# Patient Record
Sex: MALE | Race: WHITE | Employment: OTHER | URBAN - METROPOLITAN AREA
[De-identification: names, ages, dates, MRNs, and addresses within clinical notes are randomized per-mention and may not be internally consistent; named-entity substitution may affect disease eponyms.]

---

## 2017-01-04 ENCOUNTER — ALLIED HEALTH/NURSE VISIT (OUTPATIENT)
Dept: FAMILY MEDICINE | Facility: OTHER | Age: 82
End: 2017-01-04
Attending: INTERNAL MEDICINE
Payer: MEDICARE

## 2017-01-04 ENCOUNTER — APPOINTMENT (OUTPATIENT)
Dept: LAB | Facility: OTHER | Age: 82
End: 2017-01-04
Attending: INTERNAL MEDICINE
Payer: MEDICARE

## 2017-01-04 VITALS — SYSTOLIC BLOOD PRESSURE: 139 MMHG | DIASTOLIC BLOOD PRESSURE: 68 MMHG

## 2017-01-04 DIAGNOSIS — I10 HTN (HYPERTENSION): Primary | ICD-10-CM

## 2017-01-04 PROCEDURE — 99207 ZZC NO CHARGE NURSE ONLY: CPT

## 2017-01-04 NOTE — PROGRESS NOTES
Patient here for a blood pressure, check states he has been taking amlodipine and not HCTZ. I told patient that this is not on his list but he insisted that he is still taking the amlodipine. I also read the note to the patient from 12/28/2016. Patient has follow up appointment on 01/23/2017. Will notify patient that he can either come in for labs for early. Either before appointment or a week before.   BP was 139/68. Dr. Schwab notified   [facility]

## 2017-01-16 ENCOUNTER — TELEPHONE (OUTPATIENT)
Dept: INTERNAL MEDICINE | Facility: OTHER | Age: 82
End: 2017-01-16

## 2017-01-16 ENCOUNTER — OFFICE VISIT (OUTPATIENT)
Dept: INTERNAL MEDICINE | Facility: OTHER | Age: 82
End: 2017-01-16
Attending: INTERNAL MEDICINE
Payer: MEDICARE

## 2017-01-16 VITALS
OXYGEN SATURATION: 98 % | HEART RATE: 58 BPM | RESPIRATION RATE: 18 BRPM | HEIGHT: 67 IN | DIASTOLIC BLOOD PRESSURE: 68 MMHG | WEIGHT: 158.2 LBS | BODY MASS INDEX: 24.83 KG/M2 | SYSTOLIC BLOOD PRESSURE: 120 MMHG

## 2017-01-16 DIAGNOSIS — I10 BENIGN ESSENTIAL HYPERTENSION: ICD-10-CM

## 2017-01-16 DIAGNOSIS — R09.89 PHLEGM IN THROAT: ICD-10-CM

## 2017-01-16 DIAGNOSIS — I10 BENIGN ESSENTIAL HYPERTENSION: Primary | ICD-10-CM

## 2017-01-16 LAB
ANION GAP SERPL CALCULATED.3IONS-SCNC: 11 MMOL/L (ref 3–14)
BUN SERPL-MCNC: 16 MG/DL (ref 7–30)
CALCIUM SERPL-MCNC: 8.7 MG/DL (ref 8.5–10.1)
CHLORIDE SERPL-SCNC: 104 MMOL/L (ref 94–109)
CO2 SERPL-SCNC: 24 MMOL/L (ref 20–32)
CREAT SERPL-MCNC: 1.2 MG/DL (ref 0.66–1.25)
GFR SERPL CREATININE-BSD FRML MDRD: 58 ML/MIN/1.7M2
GLUCOSE SERPL-MCNC: 96 MG/DL (ref 70–99)
POTASSIUM SERPL-SCNC: 4.2 MMOL/L (ref 3.4–5.3)
SODIUM SERPL-SCNC: 139 MMOL/L (ref 133–144)

## 2017-01-16 PROCEDURE — 36415 COLL VENOUS BLD VENIPUNCTURE: CPT | Performed by: INTERNAL MEDICINE

## 2017-01-16 PROCEDURE — 99213 OFFICE O/P EST LOW 20 MIN: CPT | Performed by: INTERNAL MEDICINE

## 2017-01-16 PROCEDURE — 99212 OFFICE O/P EST SF 10 MIN: CPT

## 2017-01-16 PROCEDURE — 80048 BASIC METABOLIC PNL TOTAL CA: CPT | Performed by: INTERNAL MEDICINE

## 2017-01-16 RX ORDER — AMLODIPINE BESYLATE 10 MG/1
10 TABLET ORAL
COMMUNITY
Start: 2016-12-28 | End: 2017-01-16

## 2017-01-16 RX ORDER — AMLODIPINE BESYLATE 10 MG/1
10 TABLET ORAL DAILY
Qty: 90 TABLET | Refills: 3 | Status: SHIPPED | OUTPATIENT
Start: 2017-01-16 | End: 2017-07-05

## 2017-01-16 ASSESSMENT — PAIN SCALES - GENERAL: PAINLEVEL: MODERATE PAIN (5)

## 2017-01-16 NOTE — MR AVS SNAPSHOT
"              After Visit Summary   1/16/2017    Javier Amador    MRN: [id number]           Patient Information     Date Of Birth          1935        Visit Information        Provider Department      1/16/2017 9:45 AM Yosvany Schwab,  [facility]        Today's Diagnoses     Benign essential hypertension    -  1        Follow-ups after your visit        Your next 10 appointments already scheduled     Jan 16, 2017  9:45 AM   (Arrive by 9:30 AM)   SHORT with Yosvany Schwab DO   [facility] ([facility] )    [address]  [place] [address] [phone]            Jan 23, 2017  9:30 AM   (Arrive by 9:15 AM)   SHORT with Yosvany Schwab DO   [facility] ([facility] )    [address]  [place] [address] [phone]              Who to contact     If you have questions or need follow up information about today's clinic visit or your schedule please contact [facility] directly at [phone].  Normal or non-critical lab and imaging results will be communicated to you by [employer], letter or phone within 4 business days after the clinic has received the results. If you do not hear from us within 7 days, please contact the clinic through [employer] or phone. If you have a critical or abnormal lab result, we will notify you by phone as soon as possible.  Submit refill requests through [employer] or call your pharmacy and they will forward the refill request to us. Please allow 3 business days for your refill to be completed.          Additional Information About Your Visit        MyChart Information     [employer] lets you send messages to your doctor, view your test results, renew your prescriptions, schedule appointments and more. To sign up, go to [email or web address] . Click on \"Log in\" on the left side of the screen, which will take you to the Welcome page. Then click on " "\"Sign up Now\" on the right side of the page.     You will be asked to enter the access code listed below, as well as some personal information. Please follow the directions to create your username and password.     Your access code is: [email or web address]  Expires: 2017  1:35 PM     Your access code will  in 90 days. If you need help or a new code, please call your [facility] or [phone].        Care EveryWhere ID     This is your Care EveryWhere ID. This could be used by other organizations to access your [facility] medical records  [id number]        Your Vitals Were     Pulse Respirations Height BMI (Body Mass Index) Pulse Oximetry       58 18 5' 7\" (1.702 m) 24.77 kg/m2 98%        Blood Pressure from Last 3 Encounters:   17 120/68   17 139/68   16 180/84    Weight from Last 3 Encounters:   17 158 lb 3.2 oz (71.759 kg)   16 150 lb (68.04 kg)   16 145 lb (65.772 kg)              Today, you had the following     No orders found for display         Today's Medication Changes          These changes are accurate as of: 17  9:42 AM.  If you have any questions, ask your nurse or doctor.               These medicines have changed or have updated prescriptions.        Dose/Directions    amLODIPine 10 MG tablet   Commonly known as:  NORVASC   This may have changed:  when to take this   Used for:  Benign essential hypertension   Changed by:  Yosvany Schwab DO        Dose:  10 mg   Take 1 tablet (10 mg) by mouth daily   Quantity:  90 tablet   Refills:  3            Where to get your medicines      These medications were sent to [facility] - [place], [place] - [address] AT [address]  [place], [facility] [address]     Phone:  [phone]    - amLODIPine 10 MG tablet             Primary Care Provider Office Phone # Fax #    Yosvany Schwab [phone]       [facility] [address] [place] [address]        Thank you!     Thank you for choosing [facility]  for your care. Our goal is always to provide you with excellent care. Hearing back from our patients is one way we can continue to improve our services. Please take a few minutes to complete the written survey that you may receive in the mail after your visit with us. Thank you!             Your Updated Medication List - Protect others around you: Learn how to safely use, store and throw away your medicines at www.disposemymeds.org.          This list is accurate as of: 1/16/17  9:42 AM.  Always use your most recent med list.                   Brand Name Dispense Instructions for use    amLODIPine 10 MG tablet    NORVASC    90 tablet    Take 1 tablet (10 mg) by mouth daily       atenolol 50 MG tablet    TENORMIN    90 tablet    Take 1 tablet (50 mg) by mouth daily       fluorometholone 0.1 % ophthalmic susp    FML LIQUIFILM     Inject 1 drop into the eye       folic acid 400 MCG tablet    FOLVITE     Take 400 mcg by mouth       fosinopril 40 MG tablet    MONOPRIL    90 tablet    Take 1 tablet (40 mg) by mouth daily       HYDROcodone-acetaminophen  MG per tablet    NORCO    140 tablet    Take 1 tablet by mouth every 6 hours as needed for moderate to severe pain       ranitidine 150 MG/10ML syrup    Zantac    600 mL    Take 8 mLs (120 mg) by mouth 2 times daily

## 2017-01-16 NOTE — PROGRESS NOTES
Internal Medicine:    Subjective:  Javier presents today due to concern of phlegm in his throat.  States this sensation has been there for about three weeks.  He denies any associated cough or SOB.  NO fevers are reported.  Denies any ST.  In addition he reports his BP is improved and he continues on Norvasc but needs mail order refills for this.         Patient Active Problem List   Diagnosis     Acute gouty arthritis     Generalized osteoarthrosis, unspecified site     CLL (chronic lymphocytic leukemia) (H)     HTN (hypertension)     Hypercholesterolemia     Bilateral low back pain with sciatica, sciatica laterality unspecified     Chronic pain syndrome     Osteoarthritis of multiple joints     ACP (advance care planning)            Past Medical History   Diagnosis Date     Cancer (H)      High cholesterol      Hypertension      Hx of migraine headaches      Arthritis      osteoarthritis     Peptic ulcer disease      Measles      Mumps      Chicken pox      Whooping cough      Viral pneumonia      H pylori ulcer      Hiatal hernia      Hypertension      Cancer of prostate (H)             Past Surgical History   Procedure Laterality Date     Abdomen surgery  1960     left inguinal herniorrhaphy     Abdomen surgery  6-5-1997     radical retropubic prostatectomy w/pelvic lymph node dissection     Head & neck surgery  1941     tonsillectomy     Head & neck surgery  8-     removal of Warthin's tumor right neck     Eye surgery  6-1-2009     removal of right cataract, implant multifocal IOL     Eye surgery  7-     removal of left cataract implant of tecnic multifocal IOL     Biopsy  04-     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     Soft tissue surgery  09-     removal of small hard cyst left neck     Orthopedic surgery       broken left ulna     Orthopedic surgery       broken left radius     Orthopedic surgery       fx left humerus     Orthopedic surgery       left clavicle      Orthopedic surgery       right radius     Orthopedic surgery       ribs, right 3rd and 4th X3     Orthopedic surgery       dislocation right shoulder X6      Orthopedic surgery       bursitis right shoulder      Orthopedic surgery       torn rotator cuff left shoulder     Orthopedic surgery       spondylolisthesis sporadic spondylodynia L4-L5     Orthopedic surgery       compression fx T-3, 5, 7, 9, 10     Orthopedic surgery       dislocation right hip, sciatic nerve     Orthopedic surgery  10-2-2011     fx left 2nd toe     Ent surgery  2014     cyst removed from left ear tip            Social History   Substance Use Topics     Smoking status: Former Smoker -- 0.50 packs/day for 70 years     Quit date: 2016     Smokeless tobacco: Never Used     Alcohol Use: No      Comment: occassional beer            Family History   Problem Relation Age of Onset     CANCER Mother       age 86     Other - See Comments Mother 98     renal failure     CANCER Brother                Allergies   Allergen Reactions     No Clinical Screening - See Comments      Zomax anaphylaxis,  Off the market due to high death rate      Talwin [Pentazocine] Visual Disturbance            Current Outpatient Prescriptions   Medication Sig Dispense Refill     amLODIPine (NORVASC) 10 MG tablet Take 1 tablet (10 mg) by mouth daily 90 tablet 3     fosinopril (MONOPRIL) 40 MG tablet Take 1 tablet (40 mg) by mouth daily 90 tablet 3     HYDROcodone-acetaminophen (NORCO)  MG per tablet Take 1 tablet by mouth every 6 hours as needed for moderate to severe pain 140 tablet 0     ranitidine (ZANTAC) 150 MG/10ML syrup Take 8 mLs (120 mg) by mouth 2 times daily 600 mL 9     atenolol (TENORMIN) 50 MG tablet Take 1 tablet (50 mg) by mouth daily 90 tablet 3     fluorometholone (FML LIQUIFILM) 0.1 % ophthalmic suspension Inject 1 drop into the eye       folic acid (FOLVITE) 400 MCG tablet Take 400 mcg by mouth       [DISCONTINUED] amLODIPine (NORVASC) 10 MG  "tablet Take 10 mg by mouth         Review Of Systems:    Ears/Nose/Throat: negative  Respiratory: No shortness of breath, dyspnea on exertion, cough, or hemoptysis  Cardiovascular: negative    Objective:   /68 mmHg  Pulse 58  Resp 18  Ht 5' 7\" (1.702 m)  Wt 158 lb 3.2 oz (71.759 kg)  BMI 24.77 kg/m2  SpO2 98%  EXAM:  Constitutional: alert and no distress   Psychiatric: mentation appears normal and affect normal/bright  Neck: Neck supple. No adenopathy. Thyroid symmetric, normal size,  ENT: ENT exam normal, no neck nodes or sinus tenderness.  NO erythema or swelling noted.         Orders placed or performed in visit on 01/16/17     amLODIPine (NORVASC) 10 MG tablet *Discontinued*     amLODIPine (NORVASC) 10 MG tablet       Assessment and Plan:    (I10) Benign essential hypertension  (primary encounter diagnosis)  Comment: Requesting mail order refill-sent #90 with 3 refills.    Plan: amLODIPine (NORVASC) 10 MG tablet    (R09.89) Phlegm in throat  Comment: Nothing visualized today.    Plan: Observe.          Yosvany Schwab, DO  "

## 2017-01-16 NOTE — NURSING NOTE
"Chief Complaint   Patient presents with     Throat Problem     Patient states that he has a piece of \"phlegm\" in his throat that he can't get rid of.       Initial /68 mmHg  Pulse 58  Resp 18  Ht 5' 7\" (1.702 m)  Wt 158 lb 3.2 oz (71.759 kg)  BMI 24.77 kg/m2  SpO2 98% Estimated body mass index is 24.77 kg/(m^2) as calculated from the following:    Height as of this encounter: 5' 7\" (1.702 m).    Weight as of this encounter: 158 lb 3.2 oz (71.759 kg).  BP completed using cuff size: heavenly Leija LPN      "

## 2017-01-23 ENCOUNTER — OFFICE VISIT (OUTPATIENT)
Dept: INTERNAL MEDICINE | Facility: OTHER | Age: 82
End: 2017-01-23
Attending: INTERNAL MEDICINE
Payer: MEDICARE

## 2017-01-23 VITALS
OXYGEN SATURATION: 98 % | SYSTOLIC BLOOD PRESSURE: 138 MMHG | HEIGHT: 66 IN | DIASTOLIC BLOOD PRESSURE: 68 MMHG | TEMPERATURE: 97.4 F | HEART RATE: 56 BPM | BODY MASS INDEX: 25.71 KG/M2 | WEIGHT: 160 LBS

## 2017-01-23 DIAGNOSIS — M54.41 MIDLINE LOW BACK PAIN WITH RIGHT-SIDED SCIATICA, UNSPECIFIED CHRONICITY: ICD-10-CM

## 2017-01-23 DIAGNOSIS — M54.40 CHRONIC LOW BACK PAIN WITH SCIATICA, SCIATICA LATERALITY UNSPECIFIED, UNSPECIFIED BACK PAIN LATERALITY: Primary | ICD-10-CM

## 2017-01-23 DIAGNOSIS — G89.29 CHRONIC LOW BACK PAIN WITH SCIATICA, SCIATICA LATERALITY UNSPECIFIED, UNSPECIFIED BACK PAIN LATERALITY: Primary | ICD-10-CM

## 2017-01-23 DIAGNOSIS — M54.40 MIDLINE LOW BACK PAIN WITH SCIATICA, SCIATICA LATERALITY UNSPECIFIED, UNSPECIFIED CHRONICITY: ICD-10-CM

## 2017-01-23 PROCEDURE — 99213 OFFICE O/P EST LOW 20 MIN: CPT | Performed by: INTERNAL MEDICINE

## 2017-01-23 PROCEDURE — 99212 OFFICE O/P EST SF 10 MIN: CPT

## 2017-01-23 RX ORDER — HYDROCODONE BITARTRATE AND ACETAMINOPHEN 10; 325 MG/1; MG/1
1 TABLET ORAL EVERY 6 HOURS PRN
Qty: 140 TABLET | Refills: 0 | Status: SHIPPED | OUTPATIENT
Start: 2017-01-23 | End: 2017-02-22

## 2017-01-23 RX ORDER — DIAZEPAM 10 MG
10 TABLET ORAL EVERY 6 HOURS PRN
Qty: 1 TABLET | Refills: 0 | Status: SHIPPED | OUTPATIENT
Start: 2017-01-23 | End: 2017-03-22

## 2017-01-23 RX ORDER — HYDROCODONE BITARTRATE AND ACETAMINOPHEN 10; 325 MG/1; MG/1
1 TABLET ORAL EVERY 6 HOURS PRN
Qty: 140 TABLET | Refills: 0 | Status: SHIPPED | OUTPATIENT
Start: 2017-01-23 | End: 2017-01-23

## 2017-01-23 ASSESSMENT — ANXIETY QUESTIONNAIRES
2. NOT BEING ABLE TO STOP OR CONTROL WORRYING: NOT AT ALL
IF YOU CHECKED OFF ANY PROBLEMS ON THIS QUESTIONNAIRE, HOW DIFFICULT HAVE THESE PROBLEMS MADE IT FOR YOU TO DO YOUR WORK, TAKE CARE OF THINGS AT HOME, OR GET ALONG WITH OTHER PEOPLE: NOT DIFFICULT AT ALL
7. FEELING AFRAID AS IF SOMETHING AWFUL MIGHT HAPPEN: NOT AT ALL
6. BECOMING EASILY ANNOYED OR IRRITABLE: SEVERAL DAYS
1. FEELING NERVOUS, ANXIOUS, OR ON EDGE: NOT AT ALL
3. WORRYING TOO MUCH ABOUT DIFFERENT THINGS: NOT AT ALL
5. BEING SO RESTLESS THAT IT IS HARD TO SIT STILL: NOT AT ALL
GAD7 TOTAL SCORE: 1

## 2017-01-23 ASSESSMENT — PATIENT HEALTH QUESTIONNAIRE - PHQ9: 5. POOR APPETITE OR OVEREATING: NOT AT ALL

## 2017-01-23 NOTE — PROGRESS NOTES
Internal Medicine:    Subjective:  Javier presents today for follow up.  He reports worsening midline low back pain.  His back pain is chronic but he reports it is worse lately.  He states he has had a steroid injection in the past which was helpful and questions whether that can be done again.  He denies any pain radiating into his legs and denies any bowel or bladder incontinence. In addition he requests a refill of his pain medication today.         Patient Active Problem List   Diagnosis     Acute gouty arthritis     Generalized osteoarthrosis, unspecified site     CLL (chronic lymphocytic leukemia) (H)     HTN (hypertension)     Hypercholesterolemia     Bilateral low back pain with sciatica, sciatica laterality unspecified     Chronic pain syndrome     Osteoarthritis of multiple joints     ACP (advance care planning)            Past Medical History   Diagnosis Date     Cancer (H)      High cholesterol      Hypertension      Hx of migraine headaches      Arthritis      osteoarthritis     Peptic ulcer disease      Measles      Mumps      Chicken pox      Whooping cough      Viral pneumonia      H pylori ulcer      Hiatal hernia      Hypertension      Cancer of prostate (H)             Past Surgical History   Procedure Laterality Date     Abdomen surgery  1960     left inguinal herniorrhaphy     Abdomen surgery  6-5-1997     radical retropubic prostatectomy w/pelvic lymph node dissection     Head & neck surgery  1941     tonsillectomy     Head & neck surgery  8-     removal of Warthin's tumor right neck     Eye surgery  6-1-2009     removal of right cataract, implant multifocal IOL     Eye surgery  7-     removal of left cataract implant of tecnic multifocal IOL     Biopsy  04-     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     Soft tissue surgery  09-     removal of small hard cyst left neck     Orthopedic surgery       broken left ulna     Orthopedic surgery       broken left radius      Orthopedic surgery       fx left humerus     Orthopedic surgery       left clavicle     Orthopedic surgery       right radius     Orthopedic surgery       ribs, right 3rd and 4th X3     Orthopedic surgery       dislocation right shoulder X6      Orthopedic surgery       bursitis right shoulder      Orthopedic surgery       torn rotator cuff left shoulder     Orthopedic surgery       spondylolisthesis sporadic spondylodynia L4-L5     Orthopedic surgery       compression fx T-3, 5, 7, 9, 10     Orthopedic surgery       dislocation right hip, sciatic nerve     Orthopedic surgery  10-2-2011     fx left 2nd toe     Ent surgery       cyst removed from left ear tip            Social History   Substance Use Topics     Smoking status: Former Smoker -- 0.50 packs/day for 70 years     Quit date: 2016     Smokeless tobacco: Never Used     Alcohol Use: 0.0 oz/week     0 Standard drinks or equivalent per week      Comment: occassional beer            Family History   Problem Relation Age of Onset     CANCER Mother       age 86     Other - See Comments Mother 98     renal failure     CANCER Brother                Allergies   Allergen Reactions     No Clinical Screening - See Comments      Zomax anaphylaxis,  Off the market due to high death rate      Talwin [Pentazocine] Visual Disturbance            Current Outpatient Prescriptions   Medication Sig Dispense Refill     HYDROcodone-acetaminophen (NORCO)  MG per tablet Take 1 tablet by mouth every 6 hours as needed for moderate to severe pain 140 tablet 0     diazepam (VALIUM) 10 MG tablet Take 1 tablet (10 mg) by mouth every 6 hours as needed for anxiety or sleep Take 30-60 minutes before procedure.  Do not operate a vehicle after taking this medication. 1 tablet 0     fosinopril (MONOPRIL) 40 MG tablet Take 1 tablet (40 mg) by mouth daily 90 tablet 3     ranitidine (ZANTAC) 150 MG/10ML syrup Take 8 mLs (120 mg) by mouth 2 times daily 600 mL 9     atenolol  "(TENORMIN) 50 MG tablet Take 1 tablet (50 mg) by mouth daily 90 tablet 3     fluorometholone (FML LIQUIFILM) 0.1 % ophthalmic suspension Inject 1 drop into the eye       folic acid (FOLVITE) 400 MCG tablet Take 400 mcg by mouth       amLODIPine (NORVASC) 10 MG tablet Take 1 tablet (10 mg) by mouth daily 90 tablet 3       Review Of Systems:    Respiratory: No shortness of breath, dyspnea on exertion, cough, or hemoptysis  Cardiovascular: negative  Genitourinary: negative  Musculoskeletal: negative, as above and back pain  Neurologic: negative    Objective:   /68 mmHg  Pulse 56  Temp(Src) 97.4  F (36.3  C) (Tympanic)  Ht 5' 5.5\" (1.664 m)  Wt 160 lb (72.576 kg)  BMI 26.21 kg/m2  SpO2 98%  EXAM:  Constitutional: alert and no distress        Orders placed or performed in visit on 01/23/17     HYDROcodone-acetaminophen (NORCO)  MG per tablet     diazepam (VALIUM) 10 MG tablet       Assessment and Plan:    (M54.40,  G89.29) Chronic low back pain with sciatica, sciatica laterality unspecified, unspecified back pain laterality  (primary encounter diagnosis)  Comment: Refill of chronic pain medication.   Plan: HYDROcodone-acetaminophen (NORCO)  MG per        tablet, XR Lumbar Transforaminal Inj Single,         diazepam (VALIUM) 10 MG tablet       (M54.40) Midline low back pain with sciatica, sciatica laterality unspecified, unspecified chronicity  Comment: Valium prior to procedure.    Plan: diazepam (VALIUM) 10 MG tablet         (M54.41) Midline low back pain with right-sided sciatica, unspecified chronicity   Comment: See below.    Plan: XR Lumbar Transforaminal Inj Single               Yosvany Schwab,   "

## 2017-01-23 NOTE — NURSING NOTE
"Chief Complaint   Patient presents with     Medication Request     pt would like refill of his [facility] prescription      Recheck Medication     pt would like to know if he is able to continue to get cortisone shots in his back - he thinks last one was early october        Initial /68 mmHg  Pulse 56  Temp(Src) 97.4  F (36.3  C) (Tympanic)  Ht 5' 5.5\" (1.664 m)  Wt 160 lb (72.576 kg)  BMI 26.21 kg/m2  SpO2 98% Estimated body mass index is 26.21 kg/(m^2) as calculated from the following:    Height as of this encounter: 5' 5.5\" (1.664 m).    Weight as of this encounter: 160 lb (72.576 kg).  BP completed using cuff size: regular.  Ivania Bronson LPN      "

## 2017-01-23 NOTE — MR AVS SNAPSHOT
"              After Visit Summary   1/23/2017    Javier Amador    MRN: [id number]           Patient Information     Date Of Birth          1935        Visit Information        Provider Department      1/23/2017 9:30 AM Yosvany Schwab DO [facility]        Today's Diagnoses     Chronic low back pain with sciatica, sciatica laterality unspecified, unspecified back pain laterality    -  1     Midline low back pain with sciatica, sciatica laterality unspecified, unspecified chronicity         Midline low back pain with right-sided sciatica, unspecified chronicity             Follow-ups after your visit        Your next 10 appointments already scheduled     Jan 23, 2017  9:30 AM   (Arrive by 9:15 AM)   SHORT with Yosvany Schwab DO   [facility] ([facility] )    [address] [facility] [address] [phone]              Who to contact     If you have questions or need follow up information about today's clinic visit or your schedule please contact [facility] directly at [phone].  Normal or non-critical lab and imaging results will be communicated to you by MyChart, letter or phone within 4 business days after the clinic has received the results. If you do not hear from us within 7 days, please contact the clinic through [employer] or phone. If you have a critical or abnormal lab result, we will notify you by phone as soon as possible.  Submit refill requests through [employer] or call your pharmacy and they will forward the refill request to us. Please allow 3 business days for your refill to be completed.          Additional Information About Your Visit        MyChart Information     [employer] lets you send messages to your doctor, view your test results, renew your prescriptions, schedule appointments and more. To sign up, go to [email or web address] . Click on \"Log in\" on the left side of the screen, which will take you " "to the Welcome page. Then click on \"Sign up Now\" on the right side of the page.     You will be asked to enter the access code listed below, as well as some personal information. Please follow the directions to create your username and password.     Your access code is: [email or web address]  Expires: 2017  1:35 PM     Your access code will  in 90 days. If you need help or a new code, please call your [facility] clinic or [phone].        Care EveryWhere ID     This is your Care EveryWhere ID. This could be used by other organizations to access your [facility] medical records  [id number]        Your Vitals Were     Pulse Temperature Height BMI (Body Mass Index) Pulse Oximetry       56 97.4  F (36.3  C) (Tympanic) 5' 5.5\" (1.664 m) 26.21 kg/m2 98%        Blood Pressure from Last 3 Encounters:   17 138/68   17 120/68   17 139/68    Weight from Last 3 Encounters:   17 160 lb (72.576 kg)   17 158 lb 3.2 oz (71.759 kg)   16 150 lb (68.04 kg)              We Performed the Following     XR Lumbar Transforaminal Inj Single          Today's Medication Changes          These changes are accurate as of: 17  9:16 AM.  If you have any questions, ask your nurse or doctor.               Start taking these medicines.        Dose/Directions    diazepam 10 MG tablet   Commonly known as:  VALIUM   Used for:  Midline low back pain with sciatica, sciatica laterality unspecified, unspecified chronicity, Chronic low back pain with sciatica, sciatica laterality unspecified, unspecified back pain laterality   Started by:  Yosvany Schwab DO        Dose:  10 mg   Take 1 tablet (10 mg) by mouth every 6 hours as needed for anxiety or sleep Take 30-60 minutes before procedure.  Do not operate a vehicle after taking this medication.   Quantity:  1 tablet   Refills:  0            Where to get your medicines      Some of these will need a paper prescription and others can be bought over the " counter.  Ask your nurse if you have questions.     Bring a paper prescription for each of these medications    - diazepam 10 MG tablet  - HYDROcodone-acetaminophen  MG per tablet             Primary Care Provider Office Phone # Fax #    Yosvany Schwab [phone]       [facility] [address] [place] [address]        Thank you!     Thank you for choosing [facility]  for your care. Our goal is always to provide you with excellent care. Hearing back from our patients is one way we can continue to improve our services. Please take a few minutes to complete the written survey that you may receive in the mail after your visit with us. Thank you!             Your Updated Medication List - Protect others around you: Learn how to safely use, store and throw away your medicines at www.disposemymeds.org.          This list is accurate as of: 1/23/17  9:16 AM.  Always use your most recent med list.                   Brand Name Dispense Instructions for use    amLODIPine 10 MG tablet    NORVASC    90 tablet    Take 1 tablet (10 mg) by mouth daily       atenolol 50 MG tablet    TENORMIN    90 tablet    Take 1 tablet (50 mg) by mouth daily       diazepam 10 MG tablet    VALIUM    1 tablet    Take 1 tablet (10 mg) by mouth every 6 hours as needed for anxiety or sleep Take 30-60 minutes before procedure.  Do not operate a vehicle after taking this medication.       fluorometholone 0.1 % ophthalmic susp    FML LIQUIFILM     Inject 1 drop into the eye       folic acid 400 MCG tablet    FOLVITE     Take 400 mcg by mouth       fosinopril 40 MG tablet    MONOPRIL    90 tablet    Take 1 tablet (40 mg) by mouth daily       HYDROcodone-acetaminophen  MG per tablet    NORCO    140 tablet    Take 1 tablet by mouth every 6 hours as needed for moderate to severe pain       ranitidine 150 MG/10ML syrup    Zantac    600 mL    Take 8 mLs (120 mg) by mouth 2 times daily

## 2017-01-24 ASSESSMENT — PATIENT HEALTH QUESTIONNAIRE - PHQ9: SUM OF ALL RESPONSES TO PHQ QUESTIONS 1-9: 3

## 2017-01-24 ASSESSMENT — ANXIETY QUESTIONNAIRES: GAD7 TOTAL SCORE: 1

## 2017-02-13 ENCOUNTER — TELEPHONE (OUTPATIENT)
Dept: INTERNAL MEDICINE | Facility: OTHER | Age: 82
End: 2017-02-13

## 2017-02-13 NOTE — TELEPHONE ENCOUNTER
Called [facility] [employer] [facility] in [place] and left a message for a call back in regard to his motorized wheelchair- wondering where we are on that. Left a message to call back. Ivania Bronson LPN

## 2017-02-22 ENCOUNTER — TELEPHONE (OUTPATIENT)
Dept: INTERNAL MEDICINE | Facility: OTHER | Age: 82
End: 2017-02-22

## 2017-02-22 ENCOUNTER — OFFICE VISIT (OUTPATIENT)
Dept: INTERNAL MEDICINE | Facility: OTHER | Age: 82
End: 2017-02-22
Attending: INTERNAL MEDICINE
Payer: MEDICARE

## 2017-02-22 VITALS
HEART RATE: 58 BPM | BODY MASS INDEX: 25.55 KG/M2 | WEIGHT: 159 LBS | HEIGHT: 66 IN | OXYGEN SATURATION: 99 % | SYSTOLIC BLOOD PRESSURE: 122 MMHG | DIASTOLIC BLOOD PRESSURE: 60 MMHG | TEMPERATURE: 97.7 F

## 2017-02-22 DIAGNOSIS — G89.29 CHRONIC LOW BACK PAIN WITH SCIATICA, SCIATICA LATERALITY UNSPECIFIED, UNSPECIFIED BACK PAIN LATERALITY: ICD-10-CM

## 2017-02-22 DIAGNOSIS — M54.40 CHRONIC LOW BACK PAIN WITH SCIATICA, SCIATICA LATERALITY UNSPECIFIED, UNSPECIFIED BACK PAIN LATERALITY: ICD-10-CM

## 2017-02-22 PROCEDURE — 99213 OFFICE O/P EST LOW 20 MIN: CPT | Performed by: INTERNAL MEDICINE

## 2017-02-22 PROCEDURE — 99212 OFFICE O/P EST SF 10 MIN: CPT

## 2017-02-22 RX ORDER — HYDROCODONE BITARTRATE AND ACETAMINOPHEN 10; 325 MG/1; MG/1
1 TABLET ORAL EVERY 6 HOURS PRN
Qty: 140 TABLET | Refills: 0 | Status: SHIPPED | OUTPATIENT
Start: 2017-02-22 | End: 2017-03-22

## 2017-02-22 ASSESSMENT — ANXIETY QUESTIONNAIRES
6. BECOMING EASILY ANNOYED OR IRRITABLE: SEVERAL DAYS
5. BEING SO RESTLESS THAT IT IS HARD TO SIT STILL: NOT AT ALL
1. FEELING NERVOUS, ANXIOUS, OR ON EDGE: NOT AT ALL
3. WORRYING TOO MUCH ABOUT DIFFERENT THINGS: NOT AT ALL
2. NOT BEING ABLE TO STOP OR CONTROL WORRYING: NOT AT ALL
IF YOU CHECKED OFF ANY PROBLEMS ON THIS QUESTIONNAIRE, HOW DIFFICULT HAVE THESE PROBLEMS MADE IT FOR YOU TO DO YOUR WORK, TAKE CARE OF THINGS AT HOME, OR GET ALONG WITH OTHER PEOPLE: NOT DIFFICULT AT ALL
7. FEELING AFRAID AS IF SOMETHING AWFUL MIGHT HAPPEN: NOT AT ALL
GAD7 TOTAL SCORE: 1

## 2017-02-22 ASSESSMENT — PATIENT HEALTH QUESTIONNAIRE - PHQ9: 5. POOR APPETITE OR OVEREATING: NOT AT ALL

## 2017-02-22 NOTE — NURSING NOTE
"Chief Complaint   Patient presents with     Musculoskeletal Problem     for about 30 years pt states he has had back pain and arthritis pain - pt is having his 4th cortisone shot on friday- states if this does not work he is giving up on cortisone - pt states no relief from cortisone shots      Medication Request     pt would like to pick a prescription for his lortabs today- states prescription must say can not take anymore than 6 tablets daily        Initial /60 (BP Location: Left arm, Patient Position: Supine, Cuff Size: Adult Large)  Pulse 58  Temp 97.7  F (36.5  C) (Tympanic)  Ht 5' 5.5\" (1.664 m)  Wt 159 lb (72.1 kg)  SpO2 99%  BMI 26.06 kg/m2 Estimated body mass index is 26.06 kg/(m^2) as calculated from the following:    Height as of this encounter: 5' 5.5\" (1.664 m).    Weight as of this encounter: 159 lb (72.1 kg).  Medication Reconciliation: complete   Ivania Bronson LPN      "

## 2017-02-22 NOTE — PROGRESS NOTES
Internal Medicine:    HPI:   Javier presents today for follow up.  Unfortunately his steroid injections have yet to provide any additional relief for his back pain.  He does report 1 fall recently.  He continues to ambulate with walker and canes, he has not received his scooter yet despite us completing all that is required.  Otherwise he feels well, denies chest pain, SOB, fevers.        Patient Active Problem List   Diagnosis     Acute gouty arthritis     Generalized osteoarthrosis, unspecified site     CLL (chronic lymphocytic leukemia) (H)     HTN (hypertension)     Hypercholesterolemia     Bilateral low back pain with sciatica, sciatica laterality unspecified     Chronic pain syndrome     Osteoarthritis of multiple joints     ACP (advance care planning)            Past Medical History   Diagnosis Date     Arthritis      osteoarthritis     Cancer (H)      Cancer of prostate (H)      Chicken pox      H pylori ulcer      Hiatal hernia      High cholesterol      Hx of migraine headaches      Hypertension      Hypertension      Measles      Mumps      Peptic ulcer disease      Viral pneumonia      Whooping cough             Past Surgical History   Procedure Laterality Date     Abdomen surgery  1960     left inguinal herniorrhaphy     Abdomen surgery  6-5-1997     radical retropubic prostatectomy w/pelvic lymph node dissection     Head & neck surgery  1941     tonsillectomy     Head & neck surgery  8-     removal of Warthin's tumor right neck     Eye surgery  6-1-2009     removal of right cataract, implant multifocal IOL     Eye surgery  7-     removal of left cataract implant of tecnic multifocal IOL     Biopsy  04-     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     Soft tissue surgery  09-     removal of small hard cyst left neck     Orthopedic surgery       broken left ulna     Orthopedic surgery       broken left radius     Orthopedic surgery       fx left humerus     Orthopedic  surgery       left clavicle     Orthopedic surgery       right radius     Orthopedic surgery       ribs, right 3rd and 4th X3     Orthopedic surgery       dislocation right shoulder X6      Orthopedic surgery       bursitis right shoulder      Orthopedic surgery       torn rotator cuff left shoulder     Orthopedic surgery       spondylolisthesis sporadic spondylodynia L4-L5     Orthopedic surgery       compression fx T-3, 5, 7, 9, 10     Orthopedic surgery       dislocation right hip, sciatic nerve     Orthopedic surgery  10-2-2011     fx left 2nd toe     Ent surgery       cyst removed from left ear tip            Social History   Substance Use Topics     Smoking status: Former Smoker     Packs/day: 0.50     Years: 70.00     Quit date: 2016     Smokeless tobacco: Never Used     Alcohol use No      Comment: occassional beer            Family History   Problem Relation Age of Onset     CANCER Mother       age 86     Other - See Comments Mother 98     renal failure     CANCER Brother                Allergies   Allergen Reactions     No Clinical Screening - See Comments      Zomax anaphylaxis,  Off the market due to high death rate      Talwin [Pentazocine] Visual Disturbance            Current Outpatient Prescriptions   Medication Sig Dispense Refill     HYDROcodone-acetaminophen (NORCO)  MG per tablet Take 1 tablet by mouth every 6 hours as needed for moderate to severe pain 140 tablet 0     diazepam (VALIUM) 10 MG tablet Take 1 tablet (10 mg) by mouth every 6 hours as needed for anxiety or sleep Take 30-60 minutes before procedure.  Do not operate a vehicle after taking this medication. 1 tablet 0     amLODIPine (NORVASC) 10 MG tablet Take 1 tablet (10 mg) by mouth daily 90 tablet 3     fosinopril (MONOPRIL) 40 MG tablet Take 1 tablet (40 mg) by mouth daily 90 tablet 3     ranitidine (ZANTAC) 150 MG/10ML syrup Take 8 mLs (120 mg) by mouth 2 times daily 600 mL 9     atenolol (TENORMIN) 50 MG tablet  "Take 1 tablet (50 mg) by mouth daily 90 tablet 3     fluorometholone (FML LIQUIFILM) 0.1 % ophthalmic suspension Inject 1 drop into the eye       folic acid (FOLVITE) 400 MCG tablet Take 400 mcg by mouth         Review Of Systems:    Skin: negative  Ears/Nose/Throat: negative  Respiratory: No shortness of breath, dyspnea on exertion, cough, or hemoptysis  Cardiovascular: negative  Gastrointestinal: negative  Genitourinary: negative  Musculoskeletal: back pain, neck pain, arthritis, joint pain, joint swelling and joint stiffness  Neurologic: local weakness, incoordination and unsteady gait  Psychiatric: negative  Hematologic/Lymphatic/Immunologic: CLL      Objective:   /60 (BP Location: Left arm, Patient Position: Supine, Cuff Size: Adult Large)  Pulse 58  Temp 97.7  F (36.5  C) (Tympanic)  Ht 5' 5.5\" (1.664 m)  Wt 159 lb (72.1 kg)  SpO2 99%  BMI 26.06 kg/m2  EXAM:  Constitutional: alert and no distress   Cardiovascular: RRR. No murmurs, clicks gallops or rub  Respiratory: Lungs clear  Psychiatric: mentation appears normal and affect normal/bright  Abdomen: Abdomen soft, non-tender. BS normal. No masses, organomegaly  NEURO: Unsteady gait -uses canes and walker.    JOINT/EXTREMITIES: Unsteady gait, with significant Kyphosis.         Orders placed or performed in visit on 02/22/17     HYDROcodone-acetaminophen (NORCO)  MG per tablet       Assessment and Plan:    (M54.40,  G89.29) Chronic low back pain with sciatica, sciatica laterality unspecified, unspecified back pain laterality  Comment: Refill of Norco today.  Not to exceed more than 6 per day.  Patient is scheduled for another steroid injection.  Awaiting word from [employer] regarding his scooter approval.    Plan: HYDROcodone-acetaminophen (NORCO)  MG per        Tablet #140.         Yosvany Schwab, DO  "

## 2017-02-22 NOTE — TELEPHONE ENCOUNTER
Called Alyssa at [facility] and asked where we are at with the Prior authorization for patients chantal Lozanoe states she got a denial from [facility] because they told her she has to go through medicare first. Alyssa sent the prior auth to medicare on 2/14/17 and is still waiting for an answer from the. Alyssa states when she gets the answer she will contact the patient and set up what she needs to with the patient. She states she has all the documentation she needs from us at this time. Ivania Bronson LPN

## 2017-02-22 NOTE — MR AVS SNAPSHOT
"              After Visit Summary   2017    Javier Amador    MRN: [id number]           Patient Information     Date Of Birth          1935        Visit Information        Provider Department      2017 8:00 AM Yosvany Schwab,  [facility]        Today's Diagnoses     Chronic low back pain with sciatica, sciatica laterality unspecified, unspecified back pain laterality           Follow-ups after your visit        Who to contact     If you have questions or need follow up information about today's clinic visit or your schedule please contact [facility] directly at [phone].  Normal or non-critical lab and imaging results will be communicated to you by MyChart, letter or phone within 4 business days after the clinic has received the results. If you do not hear from us within 7 days, please contact the clinic through [employer] or phone. If you have a critical or abnormal lab result, we will notify you by phone as soon as possible.  Submit refill requests through [employer] or call your pharmacy and they will forward the refill request to us. Please allow 3 business days for your refill to be completed.          Additional Information About Your Visit        MyChart Information     [employer] lets you send messages to your doctor, view your test results, renew your prescriptions, schedule appointments and more. To sign up, go to [email or web address] . Click on \"Log in\" on the left side of the screen, which will take you to the Welcome page. Then click on \"Sign up Now\" on the right side of the page.     You will be asked to enter the access code listed below, as well as some personal information. Please follow the directions to create your username and password.     Your access code is: [email or web address]  Expires: 2017  8:05 AM     Your access code will  in 90 days. If you need help or a new code, please call your [facility] or [phone].        Care " "EveryWhere ID     This is your Care EveryWhere ID. This could be used by other organizations to access your [facility] medical records  [id number]        Your Vitals Were     Pulse Temperature Height Pulse Oximetry BMI (Body Mass Index)       58 97.7  F (36.5  C) (Tympanic) 5' 5.5\" (1.664 m) 99% 26.06 kg/m2        Blood Pressure from Last 3 Encounters:   02/22/17 122/60   01/23/17 138/68   01/16/17 120/68    Weight from Last 3 Encounters:   02/22/17 159 lb (72.1 kg)   01/23/17 160 lb (72.6 kg)   01/16/17 158 lb 3.2 oz (71.8 kg)              Today, you had the following     No orders found for display         Where to get your medicines      Some of these will need a paper prescription and others can be bought over the counter.  Ask your nurse if you have questions.     Bring a paper prescription for each of these medications     HYDROcodone-acetaminophen  MG per tablet          Primary Care Provider Office Phone # Fax #    Yosvany Schwab,  [phone]       [facility] [address] [place] [id number]        Thank you!     Thank you for choosing [facility]  for your care. Our goal is always to provide you with excellent care. Hearing back from our patients is one way we can continue to improve our services. Please take a few minutes to complete the written survey that you may receive in the mail after your visit with us. Thank you!             Your Updated Medication List - Protect others around you: Learn how to safely use, store and throw away your medicines at www.disposemymeds.org.          This list is accurate as of: 2/22/17  8:05 AM.  Always use your most recent med list.                   Brand Name Dispense Instructions for use    amLODIPine 10 MG tablet    NORVASC    90 tablet    Take 1 tablet (10 mg) by mouth daily       atenolol 50 MG tablet    TENORMIN    90 tablet    Take 1 tablet (50 mg) by mouth daily       diazepam 10 MG tablet    VALIUM    1 " tablet    Take 1 tablet (10 mg) by mouth every 6 hours as needed for anxiety or sleep Take 30-60 minutes before procedure.  Do not operate a vehicle after taking this medication.       fluorometholone 0.1 % ophthalmic susp    FML LIQUIFILM     Inject 1 drop into the eye       folic acid 400 MCG tablet    FOLVITE     Take 400 mcg by mouth       fosinopril 40 MG tablet    MONOPRIL    90 tablet    Take 1 tablet (40 mg) by mouth daily       HYDROcodone-acetaminophen  MG per tablet    NORCO    140 tablet    Take 1 tablet by mouth every 6 hours as needed for moderate to severe pain       ranitidine 150 MG/10ML syrup    Zantac    600 mL    Take 8 mLs (120 mg) by mouth 2 times daily

## 2017-02-23 ASSESSMENT — PATIENT HEALTH QUESTIONNAIRE - PHQ9: SUM OF ALL RESPONSES TO PHQ QUESTIONS 1-9: 0

## 2017-02-23 ASSESSMENT — ANXIETY QUESTIONNAIRES: GAD7 TOTAL SCORE: 1

## 2017-03-02 ENCOUNTER — TRANSFERRED RECORDS (OUTPATIENT)
Dept: HEALTH INFORMATION MANAGEMENT | Facility: HOSPITAL | Age: 82
End: 2017-03-02

## 2017-03-20 ENCOUNTER — TRANSFERRED RECORDS (OUTPATIENT)
Dept: HEALTH INFORMATION MANAGEMENT | Facility: HOSPITAL | Age: 82
End: 2017-03-20

## 2017-03-22 ENCOUNTER — OFFICE VISIT (OUTPATIENT)
Dept: INTERNAL MEDICINE | Facility: OTHER | Age: 82
End: 2017-03-22
Attending: INTERNAL MEDICINE
Payer: MEDICARE

## 2017-03-22 VITALS
TEMPERATURE: 97.4 F | DIASTOLIC BLOOD PRESSURE: 60 MMHG | BODY MASS INDEX: 25.55 KG/M2 | SYSTOLIC BLOOD PRESSURE: 100 MMHG | WEIGHT: 159 LBS | HEIGHT: 66 IN | HEART RATE: 61 BPM

## 2017-03-22 DIAGNOSIS — C91.10 CLL (CHRONIC LYMPHOCYTIC LEUKEMIA) (H): ICD-10-CM

## 2017-03-22 DIAGNOSIS — G89.29 CHRONIC LOW BACK PAIN WITH SCIATICA, SCIATICA LATERALITY UNSPECIFIED, UNSPECIFIED BACK PAIN LATERALITY: ICD-10-CM

## 2017-03-22 DIAGNOSIS — Z13.220 SCREENING FOR HYPERLIPIDEMIA: Primary | ICD-10-CM

## 2017-03-22 DIAGNOSIS — I10 BENIGN ESSENTIAL HYPERTENSION: ICD-10-CM

## 2017-03-22 DIAGNOSIS — M54.40 CHRONIC LOW BACK PAIN WITH SCIATICA, SCIATICA LATERALITY UNSPECIFIED, UNSPECIFIED BACK PAIN LATERALITY: ICD-10-CM

## 2017-03-22 DIAGNOSIS — M54.40 MIDLINE LOW BACK PAIN WITH SCIATICA, SCIATICA LATERALITY UNSPECIFIED, UNSPECIFIED CHRONICITY: ICD-10-CM

## 2017-03-22 LAB
ANION GAP SERPL CALCULATED.3IONS-SCNC: 10 MMOL/L (ref 3–14)
BUN SERPL-MCNC: 15 MG/DL (ref 7–30)
CALCIUM SERPL-MCNC: 9.2 MG/DL (ref 8.5–10.1)
CHLORIDE SERPL-SCNC: 104 MMOL/L (ref 94–109)
CHOLEST SERPL-MCNC: 190 MG/DL
CO2 SERPL-SCNC: 23 MMOL/L (ref 20–32)
CREAT SERPL-MCNC: 1.3 MG/DL (ref 0.66–1.25)
GFR SERPL CREATININE-BSD FRML MDRD: 53 ML/MIN/1.7M2
GLUCOSE SERPL-MCNC: 94 MG/DL (ref 70–99)
HDLC SERPL-MCNC: 38 MG/DL
LDLC SERPL CALC-MCNC: 116 MG/DL
NONHDLC SERPL-MCNC: 152 MG/DL
POTASSIUM SERPL-SCNC: 4.1 MMOL/L (ref 3.4–5.3)
SODIUM SERPL-SCNC: 137 MMOL/L (ref 133–144)
TRIGL SERPL-MCNC: 182 MG/DL

## 2017-03-22 PROCEDURE — 80061 LIPID PANEL: CPT | Performed by: INTERNAL MEDICINE

## 2017-03-22 PROCEDURE — 36415 COLL VENOUS BLD VENIPUNCTURE: CPT | Performed by: INTERNAL MEDICINE

## 2017-03-22 PROCEDURE — 99214 OFFICE O/P EST MOD 30 MIN: CPT | Performed by: INTERNAL MEDICINE

## 2017-03-22 PROCEDURE — 80048 BASIC METABOLIC PNL TOTAL CA: CPT | Performed by: INTERNAL MEDICINE

## 2017-03-22 PROCEDURE — 99212 OFFICE O/P EST SF 10 MIN: CPT

## 2017-03-22 RX ORDER — DIAZEPAM 10 MG
10 TABLET ORAL EVERY 6 HOURS PRN
Qty: 10 TABLET | Refills: 0 | Status: SHIPPED | OUTPATIENT
Start: 2017-03-22 | End: 2018-06-20

## 2017-03-22 RX ORDER — HYDROCODONE BITARTRATE AND ACETAMINOPHEN 10; 325 MG/1; MG/1
1 TABLET ORAL EVERY 6 HOURS PRN
Qty: 140 TABLET | Refills: 0 | Status: SHIPPED | OUTPATIENT
Start: 2017-03-22 | End: 2017-03-22

## 2017-03-22 RX ORDER — HYDROCODONE BITARTRATE AND ACETAMINOPHEN 10; 325 MG/1; MG/1
1 TABLET ORAL EVERY 6 HOURS PRN
Qty: 140 TABLET | Refills: 0 | Status: SHIPPED | OUTPATIENT
Start: 2017-03-22 | End: 2017-04-24

## 2017-03-22 NOTE — PROGRESS NOTES
Internal Medicine:    HPI:   Javier presents today for follow up.  He was recently seen by Heme/Onc for his CLL-his numbers/labs look good.  Javier did have an injection in his back but still has back pain and no clear benefit from the injection noted.  He needs a refill of his Norco today along with his Valium which he uses when he travels on flights and long rides to assist with some anxiety and back pain and back spasms.   He still has not received the scooter we authorized for him months ago.  Otherwise he is doing well.  He denies any chest pain, Denies SOB, Denies any fevers.  Denies any falls.  HE continues to ambulate with 1-2 canes.  He agrees to having lipids and BMP checked today.   Labs from [facility] were also reviewed today during our visit.          Patient Active Problem List   Diagnosis     Acute gouty arthritis     Generalized osteoarthrosis, unspecified site     CLL (chronic lymphocytic leukemia) (H)     HTN (hypertension)     Hypercholesterolemia     Bilateral low back pain with sciatica, sciatica laterality unspecified     Chronic pain syndrome     Osteoarthritis of multiple joints     ACP (advance care planning)            Past Medical History:   Diagnosis Date     Arthritis     osteoarthritis     Cancer (H)      Cancer of prostate (H)      Chicken pox      H pylori ulcer      Hiatal hernia      High cholesterol      Hx of migraine headaches      Hypertension      Hypertension      Measles      Mumps      Peptic ulcer disease      Viral pneumonia      Whooping cough             Past Surgical History:   Procedure Laterality Date     ABDOMEN SURGERY  1960    left inguinal herniorrhaphy     ABDOMEN SURGERY  6-5-1997    radical retropubic prostatectomy w/pelvic lymph node dissection     BIOPSY  04-    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     ENT SURGERY  2014    cyst removed from left ear tip     EYE SURGERY  6-1-2009    removal of right cataract, implant multifocal IOL     EYE SURGERY   2009    removal of left cataract implant of tecnic multifocal IOL     HEAD & NECK SURGERY  1941    tonsillectomy     HEAD & NECK SURGERY  2009    removal of Warthin's tumor right neck     ORTHOPEDIC SURGERY      broken left ulna     ORTHOPEDIC SURGERY      broken left radius     ORTHOPEDIC SURGERY      fx left humerus     ORTHOPEDIC SURGERY      left clavicle     ORTHOPEDIC SURGERY      right radius     ORTHOPEDIC SURGERY      ribs, right 3rd and 4th X3     ORTHOPEDIC SURGERY      dislocation right shoulder X6      ORTHOPEDIC SURGERY      bursitis right shoulder      ORTHOPEDIC SURGERY      torn rotator cuff left shoulder     ORTHOPEDIC SURGERY      spondylolisthesis sporadic spondylodynia L4-L5     ORTHOPEDIC SURGERY      compression fx T-3, 5, 7, 9, 10     ORTHOPEDIC SURGERY      dislocation right hip, sciatic nerve     ORTHOPEDIC SURGERY  10-2-2011    fx left 2nd toe     SOFT TISSUE SURGERY  2013    removal of small hard cyst left neck            Social History   Substance Use Topics     Smoking status: Former Smoker     Packs/day: 0.50     Years: 70.00     Quit date: 2016     Smokeless tobacco: Never Used     Alcohol use No      Comment: occassional beer            Family History   Problem Relation Age of Onset     CANCER Mother       age 86     Other - See Comments Mother 98     renal failure     CANCER Brother                Allergies   Allergen Reactions     No Clinical Screening - See Comments      Zomax anaphylaxis,  Off the market due to high death rate      Talwin [Pentazocine] Visual Disturbance            Current Outpatient Prescriptions   Medication Sig Dispense Refill     HYDROcodone-acetaminophen (NORCO)  MG per tablet Take 1 tablet by mouth every 6 hours as needed for moderate to severe pain 140 tablet 0     diazepam (VALIUM) 10 MG tablet Take 1 tablet (10 mg) by mouth every 6 hours as needed for anxiety or sleep Take 30-60 minutes before procedure.  Do not operate a  "vehicle after taking this medication. 10 tablet 0     amLODIPine (NORVASC) 10 MG tablet Take 1 tablet (10 mg) by mouth daily 90 tablet 3     fosinopril (MONOPRIL) 40 MG tablet Take 1 tablet (40 mg) by mouth daily 90 tablet 3     ranitidine (ZANTAC) 150 MG/10ML syrup Take 8 mLs (120 mg) by mouth 2 times daily 600 mL 9     atenolol (TENORMIN) 50 MG tablet Take 1 tablet (50 mg) by mouth daily 90 tablet 3     fluorometholone (FML LIQUIFILM) 0.1 % ophthalmic suspension Inject 1 drop into the eye       folic acid (FOLVITE) 400 MCG tablet Take 400 mcg by mouth         Review Of Systems:    Ears/Nose/Throat: negative  Respiratory: No shortness of breath, dyspnea on exertion, cough, or hemoptysis  Cardiovascular: negative  Gastrointestinal: negative  Genitourinary: negative  Musculoskeletal: back pain, arthritis, joint pain and joint swelling-difficulty with ambulation    Neurologic: negative  Psychiatric: negative  Hematologic/Lymphatic/Immunologic: CLL      Objective:   /60 (BP Location: Left arm, Patient Position: Supine, Cuff Size: Adult Large)  Pulse 61  Temp 97.4  F (36.3  C) (Tympanic)  Ht 5' 5.5\" (1.664 m)  Wt 159 lb (72.1 kg)  BMI 26.06 kg/m2  EXAM:  Constitutional: alert and no distress   Cardiovascular: PMI normal. No lifts, heaves, or thrills. RRR. No murmurs, clicks gallops or rub  Respiratory: Percussion normal. Good diaphragmatic excursion. Lungs clear  Psychiatric: mentation appears normal and affect normal/bright       Orders placed or performed in visit on 03/22/17     HYDROcodone-acetaminophen (NORCO)  MG per tablet *Discontinued*     HYDROcodone-acetaminophen (NORCO)  MG per tablet     diazepam (VALIUM) 10 MG tablet       Assessment and Plan:    (Z13.220) Screening for hyperlipidemia  (primary encounter diagnosis)  Comment: Screening  Plan: Lipid Profile (Chol, Trig, HDL, LDL calc)    (M54.40,  G89.29) Chronic low back pain with sciatica, sciatica laterality unspecified, unspecified " back pain laterality  Comment:   Plan: HYDROcodone-acetaminophen (NORCO)  MG per        Tablet 140# Max 5 per day , diazepam (VALIUM) 10 MG tablet,       (I10) Benign essential hypertension  Comment: BP good today   Plan: Basic metabolic panel    (M54.40) Midline low back pain with sciatica, sciatica laterality unspecified, unspecified chronicity  Comment: For his back and anxiety when travels-flights.    Plan: diazepam (VALIUM) 10 MG tablet 10 pills given today.      (C91.10) CLL (chronic lymphocytic leukemia) (H)  Comment: Recent labs from [facility] Onc reviewed.  WBC 6.7 Hgb 13.7    Plan: Follow up with Heme/Onc as scheduled.      Patient has yet to get his scooter.  He continues to work with the company to obtain this.            Yosvany Schwab, DO

## 2017-03-22 NOTE — MR AVS SNAPSHOT
"              After Visit Summary   3/22/2017    Javier Amador    MRN: [id number]           Patient Information     Date Of Birth          1935        Visit Information        Provider Department      3/22/2017 11:00 AM Yosvany Schwab,  [facility]        Today's Diagnoses     Screening for hyperlipidemia    -  1    Chronic low back pain with sciatica, sciatica laterality unspecified, unspecified back pain laterality        Benign essential hypertension        Midline low back pain with sciatica, sciatica laterality unspecified, unspecified chronicity           Follow-ups after your visit        Your next 10 appointments already scheduled     Mar 22, 2017 11:00 AM CDT   (Arrive by 10:45 AM)   SHORT with Yosvany Schwab DO   [facility] ([facility] )    [address]  [facility] [address] [phone]              Who to contact     If you have questions or need follow up information about today's clinic visit or your schedule please contact [facility] directly at [phone].  Normal or non-critical lab and imaging results will be communicated to you by MyChart, letter or phone within 4 business days after the clinic has received the results. If you do not hear from us within 7 days, please contact the clinic through [employer] or phone. If you have a critical or abnormal lab result, we will notify you by phone as soon as possible.  Submit refill requests through [employer] or call your pharmacy and they will forward the refill request to us. Please allow 3 business days for your refill to be completed.          Additional Information About Your Visit        [employer] Information     [employer] lets you send messages to your doctor, view your test results, renew your prescriptions, schedule appointments and more. To sign up, go to [email or web address] . Click on \"Log in\" on the left side of the screen, which will take you to " "the Welcome page. Then click on \"Sign up Now\" on the right side of the page.     You will be asked to enter the access code listed below, as well as some personal information. Please follow the directions to create your username and password.     Your access code is: [email or web address]  Expires: 2017  9:05 AM     Your access code will  in 90 days. If you need help or a new code, please call your [place] clinic or [phone].        Care EveryWhere ID     This is your Care EveryWhere ID. This could be used by other organizations to access your [place] medical records  [id number]        Your Vitals Were     Pulse Temperature Height BMI (Body Mass Index)          61 97.4  F (36.3  C) (Tympanic) 5' 5.5\" (1.664 m) 26.06 kg/m2         Blood Pressure from Last 3 Encounters:   17 100/60   17 122/60   17 138/68    Weight from Last 3 Encounters:   17 159 lb (72.1 kg)   17 159 lb (72.1 kg)   17 160 lb (72.6 kg)              We Performed the Following     Basic metabolic panel     Lipid Profile (Chol, Trig, HDL, LDL calc)          Today's Medication Changes          These changes are accurate as of: 3/22/17 10:50 AM.  If you have any questions, ask your nurse or doctor.               Start taking these medicines.        Dose/Directions    HYDROcodone-acetaminophen  MG per tablet   Commonly known as:  NORCO   Used for:  Chronic low back pain with sciatica, sciatica laterality unspecified, unspecified back pain laterality   Started by:  Yosvany Schwab, DO        Dose:  1 tablet   Take 1 tablet by mouth every 6 hours as needed for moderate to severe pain   Quantity:  140 tablet   Refills:  0            Where to get your medicines      Some of these will need a paper prescription and others can be bought over the counter.  Ask your nurse if you have questions.     Bring a paper prescription for each of these medications     diazepam 10 MG tablet    " HYDROcodone-acetaminophen  MG per tablet                Primary Care Provider Office Phone # Fax #    Yosvany Schwab [phone]       [facility] [address] [place] [address]        Thank you!     Thank you for choosing [facility]  for your care. Our goal is always to provide you with excellent care. Hearing back from our patients is one way we can continue to improve our services. Please take a few minutes to complete the written survey that you may receive in the mail after your visit with us. Thank you!             Your Updated Medication List - Protect others around you: Learn how to safely use, store and throw away your medicines at www.disposemymeds.org.          This list is accurate as of: 3/22/17 10:50 AM.  Always use your most recent med list.                   Brand Name Dispense Instructions for use    amLODIPine 10 MG tablet    NORVASC    90 tablet    Take 1 tablet (10 mg) by mouth daily       atenolol 50 MG tablet    TENORMIN    90 tablet    Take 1 tablet (50 mg) by mouth daily       diazepam 10 MG tablet    VALIUM    10 tablet    Take 1 tablet (10 mg) by mouth every 6 hours as needed for anxiety or sleep Take 30-60 minutes before procedure.  Do not operate a vehicle after taking this medication.       fluorometholone 0.1 % ophthalmic susp    FML LIQUIFILM     Inject 1 drop into the eye       folic acid 400 MCG tablet    FOLVITE     Take 400 mcg by mouth       fosinopril 40 MG tablet    MONOPRIL    90 tablet    Take 1 tablet (40 mg) by mouth daily       HYDROcodone-acetaminophen  MG per tablet    NORCO    140 tablet    Take 1 tablet by mouth every 6 hours as needed for moderate to severe pain       ranitidine 150 MG/10ML syrup    Zantac    600 mL    Take 8 mLs (120 mg) by mouth 2 times daily

## 2017-03-22 NOTE — NURSING NOTE
"Chief Complaint   Patient presents with     Musculoskeletal Problem     lower back pain- pt states went for injection and did not recieve any relief from it. states only relief is from norco      Medication Request     pt would like refill of norco       Initial /60 (BP Location: Left arm, Patient Position: Supine, Cuff Size: Adult Large)  Pulse 61  Temp 97.4  F (36.3  C) (Tympanic)  Ht 5' 5.5\" (1.664 m)  Wt 159 lb (72.1 kg)  BMI 26.06 kg/m2 Estimated body mass index is 26.06 kg/(m^2) as calculated from the following:    Height as of this encounter: 5' 5.5\" (1.664 m).    Weight as of this encounter: 159 lb (72.1 kg).  Medication Reconciliation: complete   Ivania Bronson LPN      "

## 2017-03-27 ENCOUNTER — TELEPHONE (OUTPATIENT)
Dept: INTERNAL MEDICINE | Facility: OTHER | Age: 82
End: 2017-03-27

## 2017-03-27 NOTE — TELEPHONE ENCOUNTER
Pt called and states his B/P is 100/60 and he has been dealing with edema in his feet and ankles, has been raising them above his head/heart to help the swelling, it is no longer effective, pt is wondering if he can start taking one tablet of hydroclothiazide daily to help with swelling as long as his B/P does not dip too low. Pt states he will keep an eye on his B/P. Please advise. Ivania Bronson LPN

## 2017-03-27 NOTE — TELEPHONE ENCOUNTER
Pt notified. States he will let us know what he is taking- weather he is going to take daily or once in a while. Ivania Bronson LPN

## 2017-03-27 NOTE — TELEPHONE ENCOUNTER
Yes-that is fine.  If he has to take it consistently he will need to have a potassium checked.  If taking here and there does not need it checked.

## 2017-04-24 ENCOUNTER — OFFICE VISIT (OUTPATIENT)
Dept: INTERNAL MEDICINE | Facility: OTHER | Age: 82
End: 2017-04-24
Attending: INTERNAL MEDICINE
Payer: MEDICARE

## 2017-04-24 ENCOUNTER — TELEPHONE (OUTPATIENT)
Dept: INTERNAL MEDICINE | Facility: OTHER | Age: 82
End: 2017-04-24

## 2017-04-24 VITALS
OXYGEN SATURATION: 96 % | HEART RATE: 76 BPM | SYSTOLIC BLOOD PRESSURE: 120 MMHG | WEIGHT: 159 LBS | TEMPERATURE: 97.3 F | BODY MASS INDEX: 25.55 KG/M2 | DIASTOLIC BLOOD PRESSURE: 60 MMHG | HEIGHT: 66 IN

## 2017-04-24 DIAGNOSIS — G89.4 CHRONIC PAIN SYNDROME: ICD-10-CM

## 2017-04-24 DIAGNOSIS — G89.29 OTHER CHRONIC PAIN: Primary | ICD-10-CM

## 2017-04-24 DIAGNOSIS — M54.40 CHRONIC LOW BACK PAIN WITH SCIATICA, SCIATICA LATERALITY UNSPECIFIED, UNSPECIFIED BACK PAIN LATERALITY: ICD-10-CM

## 2017-04-24 DIAGNOSIS — G89.29 CHRONIC LOW BACK PAIN WITH SCIATICA, SCIATICA LATERALITY UNSPECIFIED, UNSPECIFIED BACK PAIN LATERALITY: ICD-10-CM

## 2017-04-24 PROCEDURE — 99213 OFFICE O/P EST LOW 20 MIN: CPT | Performed by: INTERNAL MEDICINE

## 2017-04-24 PROCEDURE — 99212 OFFICE O/P EST SF 10 MIN: CPT

## 2017-04-24 RX ORDER — HYDROCODONE BITARTRATE AND ACETAMINOPHEN 10; 325 MG/1; MG/1
1 TABLET ORAL EVERY 6 HOURS PRN
Qty: 140 TABLET | Refills: 0 | Status: SHIPPED | OUTPATIENT
Start: 2017-04-24 | End: 2017-05-24

## 2017-04-24 ASSESSMENT — PAIN SCALES - GENERAL: PAINLEVEL: EXTREME PAIN (8)

## 2017-04-24 NOTE — PROGRESS NOTES
Internal Medicine:    Chief Complaint   Patient presents with     Form Request     pt here for a refill of Norco - states his pain is normally around a 5 but today it is a little higher- no other concerns or questions today      Javier presents today for follow up.  He was recently seen by Heme/Onc and his labs looked good.  He was told he is in remission.  In regard to his pain, it persists but is manageable.  He is going to be traveling to [place] soon to visit his daughter.    He is otherwise without complaints.  Denies any chest pain or SOB.           Patient Active Problem List   Diagnosis     Acute gouty arthritis     Generalized osteoarthrosis, unspecified site     CLL (chronic lymphocytic leukemia) (H)     HTN (hypertension)     Hypercholesterolemia     Bilateral low back pain with sciatica, sciatica laterality unspecified     Chronic pain syndrome     Osteoarthritis of multiple joints     ACP (advance care planning)            Past Medical History:   Diagnosis Date     Arthritis     osteoarthritis     Cancer (H)      Cancer of prostate (H)      Chicken pox      H pylori ulcer      Hiatal hernia      High cholesterol      Hx of migraine headaches      Hypertension      Hypertension      Measles      Mumps      Peptic ulcer disease      Viral pneumonia      Whooping cough             Past Surgical History:   Procedure Laterality Date     ABDOMEN SURGERY  1960    left inguinal herniorrhaphy     ABDOMEN SURGERY  6-5-1997    radical retropubic prostatectomy w/pelvic lymph node dissection     BIOPSY  04-    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     ENT SURGERY  2014    cyst removed from left ear tip     EYE SURGERY  6-1-2009    removal of right cataract, implant multifocal IOL     EYE SURGERY  7-    removal of left cataract implant of tecnic multifocal IOL     HEAD & NECK SURGERY  1941    tonsillectomy     HEAD & NECK SURGERY  8-    removal of Warthin's tumor right neck     ORTHOPEDIC  SURGERY      broken left ulna     ORTHOPEDIC SURGERY      broken left radius     ORTHOPEDIC SURGERY      fx left humerus     ORTHOPEDIC SURGERY      left clavicle     ORTHOPEDIC SURGERY      right radius     ORTHOPEDIC SURGERY      ribs, right 3rd and 4th X3     ORTHOPEDIC SURGERY      dislocation right shoulder X6      ORTHOPEDIC SURGERY      bursitis right shoulder      ORTHOPEDIC SURGERY      torn rotator cuff left shoulder     ORTHOPEDIC SURGERY      spondylolisthesis sporadic spondylodynia L4-L5     ORTHOPEDIC SURGERY      compression fx T-3, 5, 7, 9, 10     ORTHOPEDIC SURGERY      dislocation right hip, sciatic nerve     ORTHOPEDIC SURGERY  10-2-2011    fx left 2nd toe     SOFT TISSUE SURGERY  2013    removal of small hard cyst left neck            Social History   Substance Use Topics     Smoking status: Former Smoker     Packs/day: 0.50     Years: 70.00     Quit date: 2016     Smokeless tobacco: Never Used     Alcohol use No      Comment: occassional beer            Family History   Problem Relation Age of Onset     CANCER Mother       age 86     Other - See Comments Mother 98     renal failure     CANCER Brother                Allergies   Allergen Reactions     No Clinical Screening - See Comments      Zomax anaphylaxis,  Off the market due to high death rate      Talwin [Pentazocine] Visual Disturbance            Current Outpatient Prescriptions   Medication Sig Dispense Refill     HYDROcodone-acetaminophen (NORCO)  MG per tablet Take 1 tablet by mouth every 6 hours as needed for moderate to severe pain 140 tablet 0     diazepam (VALIUM) 10 MG tablet Take 1 tablet (10 mg) by mouth every 6 hours as needed for anxiety or sleep Take 30-60 minutes before procedure.  Do not operate a vehicle after taking this medication. 10 tablet 0     amLODIPine (NORVASC) 10 MG tablet Take 1 tablet (10 mg) by mouth daily 90 tablet 3     fosinopril (MONOPRIL) 40 MG tablet Take 1 tablet (40 mg) by mouth  "daily 90 tablet 3     ranitidine (ZANTAC) 150 MG/10ML syrup Take 8 mLs (120 mg) by mouth 2 times daily 600 mL 9     atenolol (TENORMIN) 50 MG tablet Take 1 tablet (50 mg) by mouth daily 90 tablet 3     fluorometholone (FML LIQUIFILM) 0.1 % ophthalmic suspension Inject 1 drop into the eye       folic acid (FOLVITE) 400 MCG tablet Take 400 mcg by mouth         Review Of Systems:    Respiratory: No shortness of breath, dyspnea on exertion, cough, or hemoptysis  Cardiovascular: negative  Gastrointestinal: negative  Genitourinary: negative  Musculoskeletal: back pain, neck pain, joint pain, joint swelling and joint stiffness  Neurologic: local weakness and incoordination  Psychiatric: negative  Hematologic/Lymphatic/Immunologic: CLL in remission  Endocrine: negative     Objective:   /60 (BP Location: Right arm, Patient Position: Chair, Cuff Size: Adult Large)  Pulse 76  Temp 97.3  F (36.3  C) (Tympanic)  Ht 5' 5.5\" (1.664 m)  Wt 159 lb (72.1 kg)  SpO2 96%  BMI 26.06 kg/m2  EXAM:  Constitutional: alert and no distress   Cardiovascular:  RRR. No murmurs, clicks gallops or rub  Respiratory: Lungs clear  Psychiatric: mentation appears normal and affect normal/bright  Neck: Neck supple. No adenopathy. Thyroid symmetric, normal size,  Abdomen: Abdomen soft, non-tender. BS normal. No masses, organomegaly  SKIN: no suspicious lesions or rashes       Orders placed or performed in visit on 04/24/17     HYDROcodone-acetaminophen (NORCO)  MG per tablet       Assessment and Plan:    (G89.29) Other chronic pain  (primary encounter diagnosis)  Comment: As below.    Plan: Pain contract discussed in detail and signed today by the patient and myself.      (M54.40,  G89.29) Chronic low back pain with sciatica, sciatica laterality unspecified, unspecified back pain laterality  Comment: Refill due today.    Plan: HYDROcodone-acetaminophen (NORCO)  MG per        tablet        He will follow up in about 5 weeks at which " time we will due the annual Urine drug screen.        Yosvany Schwab, DO

## 2017-04-24 NOTE — LETTER
[facility]    04/24/17    Patient: Javier Amador  YOB: 1935  Medical Record Number: [id number]                                                                  Controlled Substance Agreement  I understand that my care provider has prescribed controlled substances (narcotics, tranquilizers, and/or stimulants) to help manage my condition(s).  I am taking this medicine to help me function or work.  I know that this is strong medicine.  It could have serious side effects and even cause a dependency on the drug.  If I stop these medicines suddenly, I could have severe withdrawal symptoms.    The risks, benefits, and side effects of these medication(s) were explained to me.  I agree that:  1. I will take part in other treatments as advised by my provider.  This may be psychiatry or counseling, physical therapy, behavioral therapy, group treatment, or a referral to a pain clinic.  I will reduce or stop my medicine when my provider tells me to do so.   2. I will take my medicines as prescribed.  I will not change the dose or schedule unless my provider tells me to.  There will be no refills if I  run out early.   I may be contacted at any time without warning and asked to complete a drug test or pill count.   3. I will keep all my appointments at the clinic.  If I miss appointments or fail to follow instructions, my provider may stop my medicine.  4. I will not ask other providers to prescribe controlled substances. And I will not accept controlled substances from other people. If I need another prescribed controlled substance for a new reason, I will notify my provider within one business day.  5. If I enroll in the [place] Medical Marijuana program, I will tell my provider.  I will also sign an agreement to share my medical records with my provider.  6. I will use one pharmacy to fill all of my controlled substance prescriptions.  If my prescription is mailed to my pharmacy, it may take 5 to 7 days  for my medicine to be ready.  7. I understand that my provider, clinic care team, and pharmacy can track controlled substance prescriptions from other providers through a central database (prescription monitoring program).  8. I will bring in my list of medications (or my medicine bottles) each time I come to the clinic.  REV-  04/2016                                                                                                                                            Page 1 of 2      [facility]    04/24/17    Patient: Javier Amador  YOB: 1935  Medical Record Number: [id number]    9. Refills of controlled substances will be made only during office hours.  It is up to me to make sure that I do not run out of my medicines on weekends or holidays.    10. I am responsible for my prescriptions.  If the medicine is lost or stolen, it will not be replaced.   I also agree not to share these medicines with anyone.  11. I agree to not use ANY illegal or recreational drugs.  This includes marijuana, cocaine, bath salts or other drugs.  I agree not to use alcohol unless my provider says I may.  I agree to give urine samples whenever asked.  If I fail to give a urine sample, the provider may stop my medicine.     12. I will tell my nurse or provider right away if I become pregnant or have a new medical problem treated outside of [facility].  13. I understand that this medicine can affect my thinking and judgment.  It may be unsafe for me to drive, use machinery and do dangerous tasks.  I will not do any of these things until I know how the medicine affects me.  If my dose changes, I will wait to see how it affects me.  I will contact my provider if I have concerns about medicine side effects.  I understand that if I do not follow any of the conditions above, my prescriptions or treatment may be stopped.    I agree that my provider, clinic care team, and pharmacy may work with any city, state or  federal law enforcement agency that investigates the misuse, sale, or other diversion of my controlled medicine. I will allow my provider to discuss my care with or share a copy of this agreement with any other treating provider, pharmacy or emergency room where I receive care.  I agree to give up (waive) any right of privacy or confidentiality with respect to these authorizations.   I have read this agreement and have asked questions about anything I did not understand.   ___________________________________    ___________________________  Patient Signature                                                           Date and Time  ___________________________________     ____________________________  Witness                                                                            Date and Time  ___________________________________  Yosvany Schwab DO  REV-  04/2016                                                                                                                                                                 Page 2 of 2

## 2017-04-24 NOTE — TELEPHONE ENCOUNTER
Pharmacy called to verify script- pt states takes 5 tablets daily- prescription wrote for 4 a day. Pharmacy verifying he can take an additional dose as needed. I told them that is ok per Dr. Schwab. Ivania Bronson LPN

## 2017-04-24 NOTE — NURSING NOTE
"No chief complaint on file.      Initial /60 (BP Location: Right arm, Patient Position: Chair, Cuff Size: Adult Large)  Pulse 76  Temp 97.3  F (36.3  C) (Tympanic)  Ht 5' 5.5\" (1.664 m)  Wt 159 lb (72.1 kg)  SpO2 96%  BMI 26.06 kg/m2 Estimated body mass index is 26.06 kg/(m^2) as calculated from the following:    Height as of this encounter: 5' 5.5\" (1.664 m).    Weight as of this encounter: 159 lb (72.1 kg).  Medication Reconciliation: complete   Ivania Bronson LPN      "

## 2017-04-24 NOTE — MR AVS SNAPSHOT
"              After Visit Summary   2017    Javier Amador    MRN: [id number]           Patient Information     Date Of Birth          1935        Visit Information        Provider Department      2017 11:00 AM Yosvany Schwab,  [facility]        Today's Diagnoses     Other chronic pain    -  1    Chronic low back pain with sciatica, sciatica laterality unspecified, unspecified back pain laterality           Follow-ups after your visit        Who to contact     If you have questions or need follow up information about today's clinic visit or your schedule please contact [facility] directly at [phone].  Normal or non-critical lab and imaging results will be communicated to you by MyChart, letter or phone within 4 business days after the clinic has received the results. If you do not hear from us within 7 days, please contact the clinic through [employer] or phone. If you have a critical or abnormal lab result, we will notify you by phone as soon as possible.  Submit refill requests through [employer] or call your pharmacy and they will forward the refill request to us. Please allow 3 business days for your refill to be completed.          Additional Information About Your Visit        MyChart Information     [employer] lets you send messages to your doctor, view your test results, renew your prescriptions, schedule appointments and more. To sign up, go to [email or web address] . Click on \"Log in\" on the left side of the screen, which will take you to the Welcome page. Then click on \"Sign up Now\" on the right side of the page.     You will be asked to enter the access code listed below, as well as some personal information. Please follow the directions to create your username and password.     Your access code is: [email or web address]  Expires: 2017  9:05 AM     Your access code will  in 90 days. If you need help or a new code, please call your [place] clinic " "or [phone].        Care EveryWhere ID     This is your Care EveryWhere ID. This could be used by other organizations to access your [place] medical records  [id number]        Your Vitals Were     Pulse Temperature Height Pulse Oximetry BMI (Body Mass Index)       76 97.3  F (36.3  C) (Tympanic) 5' 5.5\" (1.664 m) 96% 26.06 kg/m2        Blood Pressure from Last 3 Encounters:   04/24/17 120/60   03/22/17 100/60   02/22/17 122/60    Weight from Last 3 Encounters:   04/24/17 159 lb (72.1 kg)   03/22/17 159 lb (72.1 kg)   02/22/17 159 lb (72.1 kg)              We Performed the Following     Drug Screen Urine ([facility])          Where to get your medicines      Some of these will need a paper prescription and others can be bought over the counter.  Ask your nurse if you have questions.     Bring a paper prescription for each of these medications     HYDROcodone-acetaminophen  MG per tablet          Primary Care Provider Office Phone # Fax #    Yosvany Schwab,  [phone]       [facility] [address] [place] [address]        Thank you!     Thank you for choosing [facility]  for your care. Our goal is always to provide you with excellent care. Hearing back from our patients is one way we can continue to improve our services. Please take a few minutes to complete the written survey that you may receive in the mail after your visit with us. Thank you!             Your Updated Medication List - Protect others around you: Learn how to safely use, store and throw away your medicines at www.disposemymeds.org.          This list is accurate as of: 4/24/17 11:06 AM.  Always use your most recent med list.                   Brand Name Dispense Instructions for use    amLODIPine 10 MG tablet    NORVASC    90 tablet    Take 1 tablet (10 mg) by mouth daily       atenolol 50 MG tablet    TENORMIN    90 tablet    Take 1 tablet (50 mg) by mouth daily       diazepam 10 MG " tablet    VALIUM    10 tablet    Take 1 tablet (10 mg) by mouth every 6 hours as needed for anxiety or sleep Take 30-60 minutes before procedure.  Do not operate a vehicle after taking this medication.       fluorometholone 0.1 % ophthalmic susp    FML LIQUIFILM     Inject 1 drop into the eye       folic acid 400 MCG tablet    FOLVITE     Take 400 mcg by mouth       fosinopril 40 MG tablet    MONOPRIL    90 tablet    Take 1 tablet (40 mg) by mouth daily       HYDROcodone-acetaminophen  MG per tablet    NORCO    140 tablet    Take 1 tablet by mouth every 6 hours as needed for moderate to severe pain       ranitidine 150 MG/10ML syrup    Zantac    600 mL    Take 8 mLs (120 mg) by mouth 2 times daily

## 2017-05-10 DIAGNOSIS — M54.40 CHRONIC LOW BACK PAIN WITH SCIATICA, SCIATICA LATERALITY UNSPECIFIED, UNSPECIFIED BACK PAIN LATERALITY: ICD-10-CM

## 2017-05-10 DIAGNOSIS — G89.29 CHRONIC LOW BACK PAIN WITH SCIATICA, SCIATICA LATERALITY UNSPECIFIED, UNSPECIFIED BACK PAIN LATERALITY: ICD-10-CM

## 2017-05-10 DIAGNOSIS — G89.29 OTHER CHRONIC PAIN: ICD-10-CM

## 2017-05-10 LAB
AMPHETAMINES UR QL SCN: ABNORMAL
BARBITURATES UR QL: ABNORMAL
BENZODIAZ UR QL: ABNORMAL
CANNABINOIDS UR QL SCN: ABNORMAL
COCAINE UR QL: ABNORMAL
METHADONE UR QL SCN: ABNORMAL
OPIATES UR QL SCN: ABNORMAL
PCP UR QL SCN: ABNORMAL

## 2017-05-10 PROCEDURE — 80307 DRUG TEST PRSMV CHEM ANLYZR: CPT | Mod: ZL | Performed by: INTERNAL MEDICINE

## 2017-05-24 DIAGNOSIS — G89.29 CHRONIC LOW BACK PAIN WITH SCIATICA, SCIATICA LATERALITY UNSPECIFIED, UNSPECIFIED BACK PAIN LATERALITY: ICD-10-CM

## 2017-05-24 DIAGNOSIS — M54.40 CHRONIC LOW BACK PAIN WITH SCIATICA, SCIATICA LATERALITY UNSPECIFIED, UNSPECIFIED BACK PAIN LATERALITY: ICD-10-CM

## 2017-05-24 RX ORDER — HYDROCODONE BITARTRATE AND ACETAMINOPHEN 10; 325 MG/1; MG/1
1 TABLET ORAL EVERY 6 HOURS PRN
Qty: 140 TABLET | Refills: 0 | Status: SHIPPED | OUTPATIENT
Start: 2017-05-24 | End: 2017-06-20

## 2017-05-24 NOTE — TELEPHONE ENCOUNTER
Pt notified prescription ready to be picked up at the [facility] clinic . Ivania Bronson LPN

## 2017-05-24 NOTE — TELEPHONE ENCOUNTER
Reason for call:  Medication    1. Medication Name? HYDROcodone-acetaminophen (NORCO)  MG per tablet  2. Is this request for a refill? Yes  3. What [facility] do you use? [facility]  4. Have you contacted your pharmacy? No    5. If yes, when?  (Please note that the turn-around-time for prescriptions is 72 business hours; I am sending your request at this time. SEND TO  [facility]  )  Description: Patient just returned to [place] from [place] and would like a refill of the medication  Was an appointment offered for this a call? No   Preferred method for responding to this messageTelephone Call [phone]  If we cannot reach you directly, may we leave a detailed response at the number you provided? Yes  Can this message wait until your PCP/Provider returns if not available today? Yes

## 2017-06-05 ENCOUNTER — OFFICE VISIT (OUTPATIENT)
Dept: INTERNAL MEDICINE | Facility: OTHER | Age: 82
End: 2017-06-05
Attending: INTERNAL MEDICINE
Payer: MEDICARE

## 2017-06-05 VITALS
HEART RATE: 51 BPM | BODY MASS INDEX: 25.55 KG/M2 | DIASTOLIC BLOOD PRESSURE: 82 MMHG | HEIGHT: 66 IN | TEMPERATURE: 97.4 F | WEIGHT: 159 LBS | SYSTOLIC BLOOD PRESSURE: 118 MMHG | OXYGEN SATURATION: 98 %

## 2017-06-05 DIAGNOSIS — C91.10 CLL (CHRONIC LYMPHOCYTIC LEUKEMIA) (H): Primary | ICD-10-CM

## 2017-06-05 DIAGNOSIS — R53.81 PHYSICAL DECONDITIONING: ICD-10-CM

## 2017-06-05 PROCEDURE — 99212 OFFICE O/P EST SF 10 MIN: CPT

## 2017-06-05 PROCEDURE — 99214 OFFICE O/P EST MOD 30 MIN: CPT | Performed by: INTERNAL MEDICINE

## 2017-06-05 NOTE — PROGRESS NOTES
Internal Medicine:    Chief Complaint   Patient presents with     Patient Request for Note/Letter     handicap stickers for vehichles - pt requesting 2      [facility]     pt is requesting help at home- niece lives with pt and did everything for him as far as cooking, cleaning, etc.. pt niece broke pelvis in two places so he needs help at home      Javier presents today due to several social concerns.  First he was recently traveling to [place] to visit his daughter with his Niece who is his care assistant.  Unfortunately his Niece fell and fractured her pelvis down in [place] and now he is back home but has no assistance.    In addition he requests a renewal of his handicapped stickers today.    Physically he is stable and denies any chest pain, SOB or fevers.  It is noted I did fill out paperwork for him to get a scoter about 1 year ago and he still has not been able to get this scooter.           Patient Active Problem List   Diagnosis     Acute gouty arthritis     Generalized osteoarthrosis, unspecified site     CLL (chronic lymphocytic leukemia) (H)     HTN (hypertension)     Hypercholesterolemia     Bilateral low back pain with sciatica, sciatica laterality unspecified     Chronic pain syndrome     Osteoarthritis of multiple joints     ACP (advance care planning)            Past Medical History:   Diagnosis Date     Arthritis     osteoarthritis     Cancer (H)      Cancer of prostate (H)      Chicken pox      H pylori ulcer      Hiatal hernia      High cholesterol      Hx of migraine headaches      Hypertension      Hypertension      Measles      Mumps      Peptic ulcer disease      Viral pneumonia      Whooping cough             Past Surgical History:   Procedure Laterality Date     ABDOMEN SURGERY  1960    left inguinal herniorrhaphy     ABDOMEN SURGERY  6-5-1997    radical retropubic prostatectomy w/pelvic lymph node dissection     BIOPSY  04-    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     ENT SURGERY  2014    cyst removed from left ear tip     EYE SURGERY  2009    removal of right cataract, implant multifocal IOL     EYE SURGERY  2009    removal of left cataract implant of tecnic multifocal IOL     HEAD & NECK SURGERY  1941    tonsillectomy     HEAD & NECK SURGERY  2009    removal of Warthin's tumor right neck     ORTHOPEDIC SURGERY      broken left ulna     ORTHOPEDIC SURGERY      broken left radius     ORTHOPEDIC SURGERY      fx left humerus     ORTHOPEDIC SURGERY      left clavicle     ORTHOPEDIC SURGERY      right radius     ORTHOPEDIC SURGERY      ribs, right 3rd and 4th X3     ORTHOPEDIC SURGERY      dislocation right shoulder X6      ORTHOPEDIC SURGERY      bursitis right shoulder      ORTHOPEDIC SURGERY      torn rotator cuff left shoulder     ORTHOPEDIC SURGERY      spondylolisthesis sporadic spondylodynia L4-L5     ORTHOPEDIC SURGERY      compression fx T-3, 5, 7, 9, 10     ORTHOPEDIC SURGERY      dislocation right hip, sciatic nerve     ORTHOPEDIC SURGERY  10-2-2011    fx left 2nd toe     SOFT TISSUE SURGERY  2013    removal of small hard cyst left neck            Social History   Substance Use Topics     Smoking status: Former Smoker     Packs/day: 0.50     Years: 70.00     Quit date: 2016     Smokeless tobacco: Never Used     Alcohol use No      Comment: occassional beer            Family History   Problem Relation Age of Onset     CANCER Mother       age 86     Other - See Comments Mother 98     renal failure     CANCER Brother                Allergies   Allergen Reactions     No Clinical Screening - See Comments      Zomax anaphylaxis,  Off the market due to high death rate      Talwin [Pentazocine] Visual Disturbance            Current Outpatient Prescriptions   Medication Sig Dispense Refill     HYDROcodone-acetaminophen (NORCO)  MG per tablet Take 1 tablet by mouth every 6 hours as needed for moderate to severe pain May take 1 extra  "tab per day - total of 5/day 140 tablet 0     diazepam (VALIUM) 10 MG tablet Take 1 tablet (10 mg) by mouth every 6 hours as needed for anxiety or sleep Take 30-60 minutes before procedure.  Do not operate a vehicle after taking this medication. 10 tablet 0     amLODIPine (NORVASC) 10 MG tablet Take 1 tablet (10 mg) by mouth daily 90 tablet 3     fosinopril (MONOPRIL) 40 MG tablet Take 1 tablet (40 mg) by mouth daily 90 tablet 3     ranitidine (ZANTAC) 150 MG/10ML syrup Take 8 mLs (120 mg) by mouth 2 times daily 600 mL 9     atenolol (TENORMIN) 50 MG tablet Take 1 tablet (50 mg) by mouth daily 90 tablet 3     fluorometholone (FML LIQUIFILM) 0.1 % ophthalmic suspension Inject 1 drop into the eye       folic acid (FOLVITE) 400 MCG tablet Take 400 mcg by mouth         Review Of Systems:    Respiratory: No shortness of breath, dyspnea on exertion, cough, or hemoptysis  Cardiovascular: negative  Gastrointestinal: negative  Genitourinary: negative  Musculoskeletal: back pain, neck pain, arthritis, joint pain and joint stiffness  Neurologic: negative  Psychiatric: excessive stress  Hematologic/Lymphatic/Immunologic: CLL      Objective:   /82 (BP Location: Left arm, Patient Position: Supine, Cuff Size: Adult Regular)  Pulse 51  Temp 97.4  F (36.3  C) (Tympanic)  Ht 5' 5.5\" (1.664 m)  Wt 159 lb (72.1 kg)  SpO2 98%  BMI 26.06 kg/m2  EXAM:  Constitutional: alert and no distress   Cardiovascular: RRR. No murmurs, clicks gallops or rub  Respiratory:  Lungs clear  Head: Normocephalic. No masses, lesions, tenderness or abnormalities  Abdomen: Abdomen soft, non-tender. BS normal. No masses, organomegaly  NEURO: Weakness in both LEs, ambulates with cane(s).  Unsteady gait.    LYMPH: No LE edema.         Orders placed or performed in visit on 05/24/17     HYDROcodone-acetaminophen (NORCO)  MG per tablet       Assessment and Plan:    (C91.10) CLL (chronic lymphocytic leukemia) (H)  (primary encounter " diagnosis)  Comment: CLL stable.    Plan: CARE COORDINATION REFERRAL    (R53.81) Physical deconditioning  Comment: Javier presents today due to concerns regarding assistance in his home.  His Niece previously lived with him and did much of the assistance but unfortunately she fell and broke her pelvis and can no longer assist him at home.  He also requests a handicapped parking form renewal today.  This was filled out and is good until 2023.    Plan: CARE COORDINATION REFERRAL          Yosvany Schwab, DO

## 2017-06-05 NOTE — MR AVS SNAPSHOT
After Visit Summary   6/5/2017    Javier Amador    MRN: [id number]           Patient Information     Date Of Birth          1935        Visit Information        Provider Department      6/5/2017 9:30 AM Yosvany Schwab DO [facility]        Today's Diagnoses     CLL (chronic lymphocytic leukemia) (H)    -  1    Physical deconditioning           Follow-ups after your visit        Additional Services     CARE COORDINATION REFERRAL       Services are provided by a Care Coordinator for people with complex needs such as: medical, social, or financial troubles.  The Care Coordinator works with the patient and their Primary Care Provider to determine health goals, obtain resources, achieve outcomes, and develop care plans that help coordinate the patient's care.     Reason for Referral:     Provide additional details for Care Coordination to best meet the patient's current needs: Patient's previous care assistant is ill.    Concerns with ADLs/IADLs  Clinical Staff have discussed the Care Coordination Referral with the patient and/or caregiver: yes                  Who to contact     If you have questions or need follow up information about today's clinic visit or your schedule please contact [facility] directly at [phone].  Normal or non-critical lab and imaging results will be communicated to you by Legend Siliconhart, letter or phone within 4 business days after the clinic has received the results. If you do not hear from us within 7 days, please contact the clinic through Amitreet or phone. If you have a critical or abnormal lab result, we will notify you by phone as soon as possible.  Submit refill requests through Etonkids or call your pharmacy and they will forward the refill request to us. Please allow 3 business days for your refill to be completed.          Additional Information About Your Visit        Etonkids Information     Etonkids lets you send messages to  "your doctor, view your test results, renew your prescriptions, schedule appointments and more. To sign up, go to [email or web address] . Click on \"Log in\" on the left side of the screen, which will take you to the Welcome page. Then click on \"Sign up Now\" on the right side of the page.     You will be asked to enter the access code listed below, as well as some personal information. Please follow the directions to create your username and password.     Your access code is: [email or web address]  Expires: 9/3/2017  9:32 AM     Your access code will  in 90 days. If you need help or a new code, please call your [place] clinic or [phone].        Care EveryWhere ID     This is your Care EveryWhere ID. This could be used by other organizations to access your [place] medical records  [id number]        Your Vitals Were     Pulse Temperature Height Pulse Oximetry BMI (Body Mass Index)       51 97.4  F (36.3  C) (Tympanic) 5' 5.5\" (1.664 m) 98% 26.06 kg/m2        Blood Pressure from Last 3 Encounters:   17 118/82   17 120/60   17 100/60    Weight from Last 3 Encounters:   17 159 lb (72.1 kg)   17 159 lb (72.1 kg)   17 159 lb (72.1 kg)              We Performed the Following     CARE COORDINATION REFERRAL        Primary Care Provider Office Phone # Fax #    Yosvany Schwab [phone]       [facility] [address] [address]        Thank you!     Thank you for choosing [facility]  for your care. Our goal is always to provide you with excellent care. Hearing back from our patients is one way we can continue to improve our services. Please take a few minutes to complete the written survey that you may receive in the mail after your visit with us. Thank you!             Your Updated Medication List - Protect others around you: Learn how to safely use, store and throw away your medicines at www.disposemymeds.org.        "   This list is accurate as of: 6/5/17  9:33 AM.  Always use your most recent med list.                   Brand Name Dispense Instructions for use    amLODIPine 10 MG tablet    NORVASC    90 tablet    Take 1 tablet (10 mg) by mouth daily       atenolol 50 MG tablet    TENORMIN    90 tablet    Take 1 tablet (50 mg) by mouth daily       diazepam 10 MG tablet    VALIUM    10 tablet    Take 1 tablet (10 mg) by mouth every 6 hours as needed for anxiety or sleep Take 30-60 minutes before procedure.  Do not operate a vehicle after taking this medication.       fluorometholone 0.1 % ophthalmic susp    FML LIQUIFILM     Inject 1 drop into the eye       folic acid 400 MCG tablet    FOLVITE     Take 400 mcg by mouth       fosinopril 40 MG tablet    MONOPRIL    90 tablet    Take 1 tablet (40 mg) by mouth daily       HYDROcodone-acetaminophen  MG per tablet    NORCO    140 tablet    Take 1 tablet by mouth every 6 hours as needed for moderate to severe pain May take 1 extra tab per day - total of 5/day       ranitidine 150 MG/10ML syrup    Zantac    600 mL    Take 8 mLs (120 mg) by mouth 2 times daily

## 2017-06-05 NOTE — NURSING NOTE
"Chief Complaint   Patient presents with     Patient Request for Note/Letter     handicap stickers for vehichles - pt requesting 2      [facility]     pt is requesting help at home- niece lives with pt and did everything for him as far as cooking, cleaning, etc.. pt niece broke pelvis in two places so he needs help at home        Initial /82 (BP Location: Left arm, Patient Position: Supine, Cuff Size: Adult Regular)  Pulse 51  Ht 5' 5.5\" (1.664 m)  Wt 159 lb (72.1 kg)  SpO2 98%  BMI 26.06 kg/m2 Estimated body mass index is 26.06 kg/(m^2) as calculated from the following:    Height as of this encounter: 5' 5.5\" (1.664 m).    Weight as of this encounter: 159 lb (72.1 kg).  Medication Reconciliation: complete   Ivania Bronson LPN      "

## 2017-06-20 DIAGNOSIS — G89.29 CHRONIC LOW BACK PAIN WITH SCIATICA, SCIATICA LATERALITY UNSPECIFIED, UNSPECIFIED BACK PAIN LATERALITY: ICD-10-CM

## 2017-06-20 DIAGNOSIS — M54.40 CHRONIC LOW BACK PAIN WITH SCIATICA, SCIATICA LATERALITY UNSPECIFIED, UNSPECIFIED BACK PAIN LATERALITY: ICD-10-CM

## 2017-06-20 NOTE — TELEPHONE ENCOUNTER
Reason for call:  Medication    1. Medication Name? HYDROcodone-acetaminophen (NORCO)  MG per tablet  2. Is this request for a refill? Yes  3. What [facility] do you use? [facility]  4. Have you contacted your pharmacy? No    5. If yes, when?  (Please note that the turn-around-time for prescriptions is 72 business hours; I am sending your request at this time. SEND TO  [facility]  )  Description: Pt states he would like to  the paper script in the [facility] and would like a call to confirm when it is ready for .  Nurse please advise. Thank you.  Was an appointment offered for this a call? No   Preferred method for responding to this messageTelephone Call: [phone]  If we cannot reach you directly, may we leave a detailed response at the number you provided? Yes  Can this message wait until your PCP/Provider returns if not available today? Not applicable, PCP Zaheer is in today.

## 2017-06-21 ENCOUNTER — TELEPHONE (OUTPATIENT)
Dept: INTERNAL MEDICINE | Facility: OTHER | Age: 82
End: 2017-06-21

## 2017-06-21 RX ORDER — HYDROCODONE BITARTRATE AND ACETAMINOPHEN 10; 325 MG/1; MG/1
1 TABLET ORAL EVERY 6 HOURS PRN
Qty: 140 TABLET | Refills: 0 | Status: SHIPPED | OUTPATIENT
Start: 2017-06-21 | End: 2017-07-24

## 2017-06-21 NOTE — TELEPHONE ENCOUNTER
Notified pt Norco ready to be picked up at the  of the clinic in [facility]. Ivania Bronson LPN

## 2017-07-05 ENCOUNTER — TELEPHONE (OUTPATIENT)
Dept: INTERNAL MEDICINE | Facility: OTHER | Age: 82
End: 2017-07-05

## 2017-07-05 DIAGNOSIS — I10 BENIGN ESSENTIAL HYPERTENSION: ICD-10-CM

## 2017-07-05 RX ORDER — ATENOLOL 50 MG/1
50 TABLET ORAL DAILY
Qty: 90 TABLET | Refills: 3 | Status: SHIPPED | OUTPATIENT
Start: 2017-07-05 | End: 2017-08-10

## 2017-07-05 RX ORDER — FOSINOPRIL SODIUM 40 MG/1
40 TABLET ORAL DAILY
Qty: 90 TABLET | Refills: 2 | Status: SHIPPED | OUTPATIENT
Start: 2017-07-05 | End: 2017-07-24

## 2017-07-05 RX ORDER — AMLODIPINE BESYLATE 10 MG/1
10 TABLET ORAL DAILY
Qty: 90 TABLET | Refills: 3 | Status: SHIPPED | OUTPATIENT
Start: 2017-07-05 | End: 2017-07-10

## 2017-07-05 NOTE — TELEPHONE ENCOUNTER
Reason for call:  Medication    1. Medication Name?3 meds listed below  Is this request for a refill?yes What Pharmacy do you use? [place]   Have you contacted your pharmacy? By mail States that doctor needs to call and order refills for him  (Please note that the turn-around-time for prescriptions is 72 business hours; I am sending your request at this time. SEND TO  [facility]  )  Description: atenonol 50 mgm  90days, fosinopril 20 mgm 90 days,amlodipine 10 mgm 90days  Was an appointment offered for this a call? No   Preferred method for responding to this messageTelephone Call (area  for the prescription refill )  If we cannot reach you directly, may we leave a detailed response at the number you provided? Yes  Can this message wait until your PCP/Provider returns if not available today? Yes

## 2017-07-10 ENCOUNTER — TELEPHONE (OUTPATIENT)
Dept: INTERNAL MEDICINE | Facility: OTHER | Age: 82
End: 2017-07-10

## 2017-07-10 DIAGNOSIS — I10 BENIGN ESSENTIAL HYPERTENSION: ICD-10-CM

## 2017-07-10 RX ORDER — AMLODIPINE BESYLATE 10 MG/1
10 TABLET ORAL DAILY
Qty: 90 TABLET | Refills: 1 | Status: SHIPPED | OUTPATIENT
Start: 2017-07-10 | End: 2019-03-13

## 2017-07-10 NOTE — TELEPHONE ENCOUNTER
"Reason for call:  Medication    1. Medication Name? amLODIPine (NORVASC) 10 MG tablet CASH  at [place], [place].  Please do NOT run through his mail order ([facility]) \"as it will screw that order up\", his plan is to use this prescription as a fill, because with the mail order system he always runs out of his supply  2. Is this request for a refill? Yes  3. What Pharmacy do you use? THIS order ONLY [place], [place]  4. Have you contacted your pharmacy? No    5. If yes, when?  (Please note that the turn-around-time for prescriptions is 72 business hours; I am sending your request at this time. SEND TO  [facility]  )  Description: Please give patient a courtesy call when it is ready for .  Thank you  Was an appointment offered for this a call? n/a   Preferred method for responding to this messageTelephone Call  If we cannot reach you directly, may we leave a detailed response at the number you provided? Yes  Can this message wait until your PCP/Provider returns if not available today? No, please fill  "

## 2017-07-24 ENCOUNTER — TELEPHONE (OUTPATIENT)
Dept: INTERNAL MEDICINE | Facility: OTHER | Age: 82
End: 2017-07-24

## 2017-07-24 ENCOUNTER — ALLIED HEALTH/NURSE VISIT (OUTPATIENT)
Dept: FAMILY MEDICINE | Facility: OTHER | Age: 82
End: 2017-07-24
Attending: INTERNAL MEDICINE
Payer: MEDICARE

## 2017-07-24 DIAGNOSIS — I10 BENIGN ESSENTIAL HYPERTENSION: ICD-10-CM

## 2017-07-24 DIAGNOSIS — G89.29 CHRONIC LOW BACK PAIN WITH SCIATICA, SCIATICA LATERALITY UNSPECIFIED, UNSPECIFIED BACK PAIN LATERALITY: ICD-10-CM

## 2017-07-24 DIAGNOSIS — M54.40 CHRONIC LOW BACK PAIN WITH SCIATICA, SCIATICA LATERALITY UNSPECIFIED, UNSPECIFIED BACK PAIN LATERALITY: ICD-10-CM

## 2017-07-24 DIAGNOSIS — E78.5 HYPERLIPEMIA: Primary | ICD-10-CM

## 2017-07-24 RX ORDER — HYDROCODONE BITARTRATE AND ACETAMINOPHEN 10; 325 MG/1; MG/1
1 TABLET ORAL EVERY 6 HOURS PRN
Qty: 140 TABLET | Refills: 0 | Status: SHIPPED | OUTPATIENT
Start: 2017-07-24 | End: 2017-08-23

## 2017-07-24 RX ORDER — FOSINOPRIL SODIUM 20 MG/1
20 TABLET ORAL DAILY
Qty: 90 TABLET | Refills: 3 | Status: SHIPPED | OUTPATIENT
Start: 2017-07-24 | End: 2018-03-23

## 2017-07-24 NOTE — PROGRESS NOTES
"Pt was here today to ask for a refill of his norco. Also noticed that his lisinopril was doubled. States his blood pressure \"tanks\" when he takes the 40mg. A message will be sent to Dr. Schwab to clarify medication. Ivania Bronson LPN    "

## 2017-07-24 NOTE — MR AVS SNAPSHOT
"              After Visit Summary   2017    Javier Amador    MRN: [id number]           Patient Information     Date Of Birth          1935        Visit Information        Provider Department      2017 9:30 AM [facility] NURSE [facility]        Today's Diagnoses     Hyperlipemia    -  1       Follow-ups after your visit        Who to contact     If you have questions or need follow up information about today's clinic visit or your schedule please contact [facility] directly at [phone].  Normal or non-critical lab and imaging results will be communicated to you by MyChart, letter or phone within 4 business days after the clinic has received the results. If you do not hear from us within 7 days, please contact the clinic through Click Bushart or phone. If you have a critical or abnormal lab result, we will notify you by phone as soon as possible.  Submit refill requests through Netadmin or call your pharmacy and they will forward the refill request to us. Please allow 3 business days for your refill to be completed.          Additional Information About Your Visit        MyChart Information     Netadmin lets you send messages to your doctor, view your test results, renew your prescriptions, schedule appointments and more. To sign up, go to [email or web address] . Click on \"Log in\" on the left side of the screen, which will take you to the Welcome page. Then click on \"Sign up Now\" on the right side of the page.     You will be asked to enter the access code listed below, as well as some personal information. Please follow the directions to create your username and password.     Your access code is: [email or web address]  Expires: 9/3/2017  9:32 AM     Your access code will  in 90 days. If you need help or a new code, please call your [facility] or [phone].        Care EveryWhere ID     This is your Care EveryWhere ID. This could be used by other organizations to access " your [place] medical records  [id number]         Blood Pressure from Last 3 Encounters:   06/05/17 118/82   04/24/17 120/60   03/22/17 100/60    Weight from Last 3 Encounters:   06/05/17 159 lb (72.1 kg)   04/24/17 159 lb (72.1 kg)   03/22/17 159 lb (72.1 kg)              Today, you had the following     No orders found for display       Primary Care Provider Office Phone # Fax #    Yosvany MARCUS Zhaeer,  [phone]       [facility] [address]  [place] [address]        Equal Access to Services     SUMAN GARZA : Hadii aad ku hadasho Soomaali, waaxda luqadaha, qaybta kaalmada adeegyada, aishwarya angelo . So [facility] [phone].    ATENCIÓN: Si habla español, tiene a stroud disposición servicios gratuitos de asistencia lingüística. Llame al [phone].    We comply with applicable federal civil rights laws and [place] laws. We do not discriminate on the basis of race, color, national origin, age, disability sex, sexual orientation or gender identity.            Thank you!     Thank you for choosing [facility]  for your care. Our goal is always to provide you with excellent care. Hearing back from our patients is one way we can continue to improve our services. Please take a few minutes to complete the written survey that you may receive in the mail after your visit with us. Thank you!             Your Updated Medication List - Protect others around you: Learn how to safely use, store and throw away your medicines at www.disposemymeds.org.          This list is accurate as of: 7/24/17  9:52 AM.  Always use your most recent med list.                   Brand Name Dispense Instructions for use Diagnosis    amLODIPine 10 MG tablet    NORVASC    90 tablet    Take 1 tablet (10 mg) by mouth daily    Benign essential hypertension       atenolol 50 MG tablet    TENORMIN    90 tablet    Take 1 tablet (50 mg) by mouth daily    Benign essential  hypertension       diazepam 10 MG tablet    VALIUM    10 tablet    Take 1 tablet (10 mg) by mouth every 6 hours as needed for anxiety or sleep Take 30-60 minutes before procedure.  Do not operate a vehicle after taking this medication.    Midline low back pain with sciatica, sciatica laterality unspecified, unspecified chronicity, Chronic low back pain with sciatica, sciatica laterality unspecified, unspecified back pain laterality       fluorometholone 0.1 % ophthalmic susp    FML LIQUIFILM     Inject 1 drop into the eye        folic acid 400 MCG tablet    FOLVITE     Take 400 mcg by mouth        fosinopril 40 MG tablet    MONOPRIL    90 tablet    Take 1 tablet (40 mg) by mouth daily    Benign essential hypertension       HYDROcodone-acetaminophen  MG per tablet    NORCO    140 tablet    Take 1 tablet by mouth every 6 hours as needed for moderate to severe pain May take 1 extra tab per day - total of 5/day    Chronic low back pain with sciatica, sciatica laterality unspecified, unspecified back pain laterality       ranitidine 150 MG/10ML syrup    Zantac    600 mL    Take 8 mLs (120 mg) by mouth 2 times daily    Chronic generalized abdominal pain

## 2017-07-24 NOTE — TELEPHONE ENCOUNTER
"Pt states he was switched to lisinopril 40mg and his blood pressure \"tanks\" when he takes the 40mg. He would like to go back to lisinopril 20mg. Pt would also like a refill of his norco- he was explained to contact pharmacy when he needs refills and they will fax our office. Please send lisinopril 20mg 90 day supply. Ivania Bronson LPN    "

## 2017-08-10 ENCOUNTER — TELEPHONE (OUTPATIENT)
Dept: INTERNAL MEDICINE | Facility: OTHER | Age: 82
End: 2017-08-10

## 2017-08-10 DIAGNOSIS — I10 BENIGN ESSENTIAL HYPERTENSION: ICD-10-CM

## 2017-08-10 RX ORDER — ATENOLOL 50 MG/1
50 TABLET ORAL DAILY
Qty: 90 TABLET | Refills: 3 | Status: SHIPPED | OUTPATIENT
Start: 2017-08-10 | End: 2018-10-02

## 2017-08-10 NOTE — TELEPHONE ENCOUNTER
Pt dropped off a piece of mail stating his refill he had requested has not been applied to - please refill his atenolol 50mg would like 90 tablets with refills. Thank you. Ivania Bronson LPN

## 2017-08-23 ENCOUNTER — TELEPHONE (OUTPATIENT)
Dept: INTERNAL MEDICINE | Facility: OTHER | Age: 82
End: 2017-08-23

## 2017-08-23 DIAGNOSIS — G89.29 CHRONIC LOW BACK PAIN WITH SCIATICA, SCIATICA LATERALITY UNSPECIFIED, UNSPECIFIED BACK PAIN LATERALITY: ICD-10-CM

## 2017-08-23 DIAGNOSIS — M54.40 CHRONIC LOW BACK PAIN WITH SCIATICA, SCIATICA LATERALITY UNSPECIFIED, UNSPECIFIED BACK PAIN LATERALITY: ICD-10-CM

## 2017-08-23 RX ORDER — HYDROCODONE BITARTRATE AND ACETAMINOPHEN 10; 325 MG/1; MG/1
1 TABLET ORAL EVERY 6 HOURS PRN
Qty: 140 TABLET | Refills: 0 | Status: SHIPPED | OUTPATIENT
Start: 2017-08-23 | End: 2017-09-25

## 2017-08-23 NOTE — TELEPHONE ENCOUNTER
Controlled Substance Refill Request for Norco  Problem List Complete:  Yes    Last Written Prescription Date:  07/24/17  Last Fill Quantity: 140,   # refills: 0    Last Office Visit with [facility] primary care provider: 06/05/17    Future Office visit:     Controlled substance agreement on file: Yes:  Date 04/24/17.     Processing:  Patient will  in clinic

## 2017-09-25 DIAGNOSIS — M54.40 CHRONIC LOW BACK PAIN WITH SCIATICA, SCIATICA LATERALITY UNSPECIFIED, UNSPECIFIED BACK PAIN LATERALITY: ICD-10-CM

## 2017-09-25 DIAGNOSIS — G89.29 CHRONIC LOW BACK PAIN WITH SCIATICA, SCIATICA LATERALITY UNSPECIFIED, UNSPECIFIED BACK PAIN LATERALITY: ICD-10-CM

## 2017-09-25 NOTE — TELEPHONE ENCOUNTER
Controlled Substance Refill Request for Norco  Problem List Complete:  Yes    Last Written Prescription Date:  08/25/17  Last Fill Quantity: 140,   # refills: 0    [facility] Office Visit with [facility] primary care provider: 06/05/17    Future Office visit:     Controlled substance agreement on file: Yes:  Date 04/24/17.     Processing:  Patient will  in clinic

## 2017-09-26 NOTE — TELEPHONE ENCOUNTER
Pt states he called last week about his prescription, and was here ([facility]) to pick it up, pt states its been 30 days. Please call when ready to be picked up in [place]. [phone]

## 2017-09-27 RX ORDER — HYDROCODONE BITARTRATE AND ACETAMINOPHEN 10; 325 MG/1; MG/1
1 TABLET ORAL EVERY 6 HOURS PRN
Qty: 140 TABLET | Refills: 0 | Status: SHIPPED | OUTPATIENT
Start: 2017-09-27 | End: 2017-10-20

## 2017-10-06 ENCOUNTER — TELEPHONE (OUTPATIENT)
Dept: INTERNAL MEDICINE | Facility: OTHER | Age: 82
End: 2017-10-06

## 2017-10-06 DIAGNOSIS — G89.29 CHRONIC GENERALIZED ABDOMINAL PAIN: ICD-10-CM

## 2017-10-06 DIAGNOSIS — R10.84 CHRONIC GENERALIZED ABDOMINAL PAIN: ICD-10-CM

## 2017-10-06 NOTE — TELEPHONE ENCOUNTER
Zantac last filled on 9.7.16 #600 ml with 9 refills. [facility] pharmacy requests refills. Upon pending medication-needs dose by weight. Please review order and calculate. Thank you

## 2017-10-06 NOTE — TELEPHONE ENCOUNTER
Call from [facility]. Needs clarification on dose of Monopril. Clarify if dose is 20 mg or 40 mg.Current order is for 20 mg. Please refer to progress note from 7.24.17. Please advise. I will contact pharmacy after clarification is confirmed. Thank you

## 2017-10-20 DIAGNOSIS — M54.40 CHRONIC LOW BACK PAIN WITH SCIATICA, SCIATICA LATERALITY UNSPECIFIED, UNSPECIFIED BACK PAIN LATERALITY: ICD-10-CM

## 2017-10-20 DIAGNOSIS — G89.29 CHRONIC LOW BACK PAIN WITH SCIATICA, SCIATICA LATERALITY UNSPECIFIED, UNSPECIFIED BACK PAIN LATERALITY: ICD-10-CM

## 2017-10-24 NOTE — TELEPHONE ENCOUNTER
Controlled Substance Refill Request for Norco  Problem List Complete:  Yes    Last Written Prescription Date:  9.27.17  Last Fill Quantity: 140,   # refills: 0    Last Office Visit with [facility] primary care provider: 6.5.17    Clinic visit frequency required: Q 3 months     Future Office visit: none    Controlled substance agreement on file: Yes:  Date 5.2.17.     Processing:  Patient will  in clinic     checked in past 6 months?  Yes 4.14.17

## 2017-10-25 RX ORDER — HYDROCODONE BITARTRATE AND ACETAMINOPHEN 10; 325 MG/1; MG/1
1 TABLET ORAL EVERY 6 HOURS PRN
Qty: 140 TABLET | Refills: 0 | Status: SHIPPED | OUTPATIENT
Start: 2017-10-25 | End: 2017-11-21

## 2017-11-08 DIAGNOSIS — R10.84 CHRONIC GENERALIZED ABDOMINAL PAIN: ICD-10-CM

## 2017-11-08 DIAGNOSIS — G89.29 CHRONIC GENERALIZED ABDOMINAL PAIN: ICD-10-CM

## 2017-11-21 DIAGNOSIS — M54.40 CHRONIC LOW BACK PAIN WITH SCIATICA, SCIATICA LATERALITY UNSPECIFIED, UNSPECIFIED BACK PAIN LATERALITY: ICD-10-CM

## 2017-11-21 DIAGNOSIS — G89.29 CHRONIC LOW BACK PAIN WITH SCIATICA, SCIATICA LATERALITY UNSPECIFIED, UNSPECIFIED BACK PAIN LATERALITY: ICD-10-CM

## 2017-11-21 NOTE — TELEPHONE ENCOUNTER
norco      Last Written Prescription Date: 10/25/17  Last Fill Quantity: 140,  # refills: 0   Last Office Visit with G, P or [facility] prescribing provider: 6/5/17

## 2017-11-22 RX ORDER — HYDROCODONE BITARTRATE AND ACETAMINOPHEN 10; 325 MG/1; MG/1
1 TABLET ORAL EVERY 6 HOURS PRN
Qty: 140 TABLET | Refills: 0 | Status: SHIPPED | OUTPATIENT
Start: 2017-11-22 | End: 2017-12-21

## 2017-11-24 NOTE — TELEPHONE ENCOUNTER
Left message that the written RX is being sent to the [facility] via

## 2017-12-14 DIAGNOSIS — G89.29 CHRONIC GENERALIZED ABDOMINAL PAIN: ICD-10-CM

## 2017-12-14 DIAGNOSIS — R10.84 CHRONIC GENERALIZED ABDOMINAL PAIN: ICD-10-CM

## 2017-12-21 DIAGNOSIS — G89.29 CHRONIC LOW BACK PAIN WITH SCIATICA, SCIATICA LATERALITY UNSPECIFIED, UNSPECIFIED BACK PAIN LATERALITY: ICD-10-CM

## 2017-12-21 DIAGNOSIS — M54.40 CHRONIC LOW BACK PAIN WITH SCIATICA, SCIATICA LATERALITY UNSPECIFIED, UNSPECIFIED BACK PAIN LATERALITY: ICD-10-CM

## 2017-12-21 RX ORDER — HYDROCODONE BITARTRATE AND ACETAMINOPHEN 10; 325 MG/1; MG/1
1 TABLET ORAL EVERY 6 HOURS PRN
Qty: 140 TABLET | Refills: 0 | Status: SHIPPED | OUTPATIENT
Start: 2017-12-21 | End: 2018-01-22

## 2017-12-21 NOTE — TELEPHONE ENCOUNTER
Patient notified prescription is ready to be picked up at the [facility], Registration.

## 2017-12-21 NOTE — TELEPHONE ENCOUNTER
norco      Last Written Prescription Date: 11/22/17  Last Fill Quantity: 140,  # refills: 0   Last Office Visit with G, P or [facility] prescribing provider: 6/5/17

## 2017-12-22 ENCOUNTER — TRANSFERRED RECORDS (OUTPATIENT)
Dept: HEALTH INFORMATION MANAGEMENT | Facility: HOSPITAL | Age: 82
End: 2017-12-22

## 2017-12-22 LAB
CREAT SERPL-MCNC: 1.12 MG/DL (ref 0.7–1.2)
GLUCOSE SERPL-MCNC: 191 MG/DL (ref 70–100)
POTASSIUM SERPL-SCNC: 3.7 MEQ/L (ref 3.4–5.1)

## 2017-12-26 NOTE — TELEPHONE ENCOUNTER
Phone issues unable to contact patient of arrival of prescription in [place] 12/26        Prescription picked up by  Javier Amador   ID Verified  Yes

## 2017-12-27 ENCOUNTER — OFFICE VISIT (OUTPATIENT)
Dept: INTERNAL MEDICINE | Facility: OTHER | Age: 82
End: 2017-12-27
Attending: INTERNAL MEDICINE
Payer: MEDICARE

## 2017-12-27 VITALS
HEART RATE: 58 BPM | OXYGEN SATURATION: 96 % | SYSTOLIC BLOOD PRESSURE: 120 MMHG | HEIGHT: 66 IN | DIASTOLIC BLOOD PRESSURE: 67 MMHG | TEMPERATURE: 96.5 F | BODY MASS INDEX: 24.11 KG/M2 | WEIGHT: 150 LBS

## 2017-12-27 DIAGNOSIS — C91.10 CHRONIC LYMPHOCYTIC LEUKEMIA (H): Primary | ICD-10-CM

## 2017-12-27 DIAGNOSIS — G89.29 OTHER CHRONIC PAIN: ICD-10-CM

## 2017-12-27 DIAGNOSIS — R10.84 ABDOMINAL PAIN, GENERALIZED: ICD-10-CM

## 2017-12-27 PROCEDURE — 99212 OFFICE O/P EST SF 10 MIN: CPT

## 2017-12-27 PROCEDURE — 99214 OFFICE O/P EST MOD 30 MIN: CPT | Performed by: INTERNAL MEDICINE

## 2017-12-27 ASSESSMENT — ANXIETY QUESTIONNAIRES: 7. FEELING AFRAID AS IF SOMETHING AWFUL MIGHT HAPPEN: SEVERAL DAYS

## 2017-12-27 ASSESSMENT — PATIENT HEALTH QUESTIONNAIRE - PHQ9: SUM OF ALL RESPONSES TO PHQ QUESTIONS 1-9: 1

## 2017-12-27 ASSESSMENT — PAIN SCALES - GENERAL: PAINLEVEL: NO PAIN (0)

## 2017-12-27 NOTE — MR AVS SNAPSHOT
"              After Visit Summary   2017    Javier Amador    MRN: [id number]           Patient Information     Date Of Birth          1935        Visit Information        Provider Department      2017 1:00 PM Yosvany Schwab DO [facility]        Today's Diagnoses     Chronic lymphocytic leukemia (H)    -  1    Abdominal pain, generalized        Other chronic pain           Follow-ups after your visit        Who to contact     If you have questions or need follow up information about today's clinic visit or your schedule please contact [facility] directly at [phone].  Normal or non-critical lab and imaging results will be communicated to you by KROGNIhart, letter or phone within 4 business days after the clinic has received the results. If you do not hear from us within 7 days, please contact the clinic through KROGNIhart or phone. If you have a critical or abnormal lab result, we will notify you by phone as soon as possible.  Submit refill requests through Stylecrook or call your pharmacy and they will forward the refill request to us. Please allow 3 business days for your refill to be completed.          Additional Information About Your Visit        MyChart Information     Stylecrook lets you send messages to your doctor, view your test results, renew your prescriptions, schedule appointments and more. To sign up, go to [email or web address] . Click on \"Log in\" on the left side of the screen, which will take you to the Welcome page. Then click on \"Sign up Now\" on the right side of the page.     You will be asked to enter the access code listed below, as well as some personal information. Please follow the directions to create your username and password.     Your access code is: [email or web address]  Expires: 3/27/2018  1:32 PM     Your access code will  in 90 days. If you need help or a new code, please call your [facility] or [phone].        Care " "EveryWhere ID     This is your Care EveryWhere ID. This could be used by other organizations to access your [place] medical records  [id number]        Your Vitals Were     Pulse Temperature Height Pulse Oximetry BMI (Body Mass Index)       58 96.5  F (35.8  C) (Tympanic) 5' 5.5\" (1.664 m) 96% 24.58 kg/m2        Blood Pressure from Last 3 Encounters:   12/27/17 120/67   06/05/17 118/82   04/24/17 120/60    Weight from Last 3 Encounters:   12/27/17 150 lb (68 kg)   06/05/17 159 lb (72.1 kg)   04/24/17 159 lb (72.1 kg)              Today, you had the following     No orders found for display       Primary Care Provider Office Phone # Fax #    Yosvany AMRIT Schwab [phone]       [facility] [address]  [facility] [address]        Equal Access to Services     MAHSA GARZA : Hadii aad ku hadasho Soomaali, waaxda luqadaha, qaybta kaalmada adeegyada, waxay idiin hayaan ingrid angelo . So [facility] [phone].    ATENCIÓN: Si hortenciala español, tiene a stroud disposición servicios gratuitos de asistencia lingüística. Llame al [phone].    We comply with applicable federal civil rights laws and [place] laws. We do not discriminate on the basis of race, color, national origin, age, disability, sex, sexual orientation, or gender identity.            Thank you!     Thank you for choosing [facility]  for your care. Our goal is always to provide you with excellent care. Hearing back from our patients is one way we can continue to improve our services. Please take a few minutes to complete the written survey that you may receive in the mail after your visit with us. Thank you!             Your Updated Medication List - Protect others around you: Learn how to safely use, store and throw away your medicines at www.disposemymeds.org.          This list is accurate as of: 12/27/17  1:32 PM.  Always use your most recent med list.                   Brand Name Dispense Instructions " for use Diagnosis    amLODIPine 10 MG tablet    NORVASC    90 tablet    Take 1 tablet (10 mg) by mouth daily    Benign essential hypertension       atenolol 50 MG tablet    TENORMIN    90 tablet    Take 1 tablet (50 mg) by mouth daily    Benign essential hypertension       diazepam 10 MG tablet    VALIUM    10 tablet    Take 1 tablet (10 mg) by mouth every 6 hours as needed for anxiety or sleep Take 30-60 minutes before procedure.  Do not operate a vehicle after taking this medication.    Midline low back pain with sciatica, sciatica laterality unspecified, unspecified chronicity, Chronic low back pain with sciatica, sciatica laterality unspecified, unspecified back pain laterality       fluorometholone 0.1 % ophthalmic susp    FML LIQUIFILM     Inject 1 drop into the eye        folic acid 400 MCG tablet    FOLVITE     Take 400 mcg by mouth        fosinopril 20 MG tablet    MONOPRIL    90 tablet    Take 1 tablet (20 mg) by mouth daily    Benign essential hypertension       HYDROcodone-acetaminophen  MG per tablet    NORCO    140 tablet    Take 1 tablet by mouth every 6 hours as needed for moderate to severe pain May take 1 extra tab per day - total of 5/day    Chronic low back pain with sciatica, sciatica laterality unspecified, unspecified back pain laterality       ranitidine 75 MG/5ML syrup    ZANTAC    600 mL    TAKE 8ML (120MG) TWO TIMES A DAY    Chronic generalized abdominal pain

## 2017-12-27 NOTE — PROGRESS NOTES
Internal Medicine:    Chief Complaint   Patient presents with     RECHECK     f/u ER visit- abdomenal pain- [facility]- middle of stomach - no pain now      Javier presents today for follow up from a visit to the [facility] on 12-22-17 for abdominal pain.  During that hospital visit he had a CT done which was fairly unremarkable except for degenerative disease of the spine and some adenopathy which was slightly increased in the abdomen related to his CLL.  He went home after the ED visit and he states the pain persisted for another 8 hours and resolved.  He denies any fevers or chills with this.  No vomiting but some nausea.  No melena reported.  No recurrent pain since that time.  In review of the ED notes, Imaging and labs it does not appear he had an US completed and there is no report of a lipase.  His WBC was actually in normal range which is notable especially in the context of CLL.  In addition Bud reports back pain that persists.  He questions today why his narcotic refills are so delayed.  We discussed this process in detail.           Patient Active Problem List   Diagnosis     Acute gouty arthritis     Generalized osteoarthrosis, unspecified site     CLL (chronic lymphocytic leukemia) (H)     HTN (hypertension)     Hypercholesterolemia     Bilateral low back pain with sciatica, sciatica laterality unspecified     Chronic pain syndrome     Osteoarthritis of multiple joints     ACP (advance care planning)            Past Medical History:   Diagnosis Date     Arthritis     osteoarthritis     Cancer (H)      Cancer of prostate (H)      Chicken pox      H pylori ulcer      Hiatal hernia      High cholesterol      Hx of migraine headaches      Hypertension      Hypertension      Measles      Mumps      Peptic ulcer disease      Viral pneumonia      Whooping cough             Past Surgical History:   Procedure Laterality Date     ABDOMEN SURGERY  1960    left inguinal herniorrhaphy     ABDOMEN SURGERY   1997    radical retropubic prostatectomy w/pelvic lymph node dissection     BIOPSY  2010    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     ENT SURGERY      cyst removed from left ear tip     EYE SURGERY  2009    removal of right cataract, implant multifocal IOL     EYE SURGERY  2009    removal of left cataract implant of tecnic multifocal IOL     HEAD & NECK SURGERY  1941    tonsillectomy     HEAD & NECK SURGERY  2009    removal of Warthin's tumor right neck     ORTHOPEDIC SURGERY      broken left ulna     ORTHOPEDIC SURGERY      broken left radius     ORTHOPEDIC SURGERY      fx left humerus     ORTHOPEDIC SURGERY      left clavicle     ORTHOPEDIC SURGERY      right radius     ORTHOPEDIC SURGERY      ribs, right 3rd and 4th X3     ORTHOPEDIC SURGERY      dislocation right shoulder X6      ORTHOPEDIC SURGERY      bursitis right shoulder      ORTHOPEDIC SURGERY      torn rotator cuff left shoulder     ORTHOPEDIC SURGERY      spondylolisthesis sporadic spondylodynia L4-L5     ORTHOPEDIC SURGERY      compression fx T-3, 5, 7, 9, 10     ORTHOPEDIC SURGERY      dislocation right hip, sciatic nerve     ORTHOPEDIC SURGERY  10-2-2011    fx left 2nd toe     SOFT TISSUE SURGERY  2013    removal of small hard cyst left neck            Social History   Substance Use Topics     Smoking status: Former Smoker     Packs/day: 0.50     Years: 70.00     Quit date: 2016     Smokeless tobacco: Never Used     Alcohol use No      Comment: occassional beer            Family History   Problem Relation Age of Onset     CANCER Mother       age 86     Other - See Comments Mother 98     renal failure     CANCER Brother                Allergies   Allergen Reactions     No Clinical Screening - See Comments      Zomax anaphylaxis,  Off the market due to high death rate      Talwin [Pentazocine] Visual Disturbance            Current Outpatient Prescriptions   Medication Sig Dispense Refill      "HYDROcodone-acetaminophen (NORCO)  MG per tablet Take 1 tablet by mouth every 6 hours as needed for moderate to severe pain May take 1 extra tab per day - total of 5/day 140 tablet 0     ranitidine (ZANTAC) 75 MG/5ML syrup TAKE 8ML (120MG) TWO TIMES A  mL 5     atenolol (TENORMIN) 50 MG tablet Take 1 tablet (50 mg) by mouth daily 90 tablet 3     fosinopril (MONOPRIL) 20 MG tablet Take 1 tablet (20 mg) by mouth daily 90 tablet 3     amLODIPine (NORVASC) 10 MG tablet Take 1 tablet (10 mg) by mouth daily 90 tablet 1     fluorometholone (FML LIQUIFILM) 0.1 % ophthalmic suspension Inject 1 drop into the eye       folic acid (FOLVITE) 400 MCG tablet Take 400 mcg by mouth       diazepam (VALIUM) 10 MG tablet Take 1 tablet (10 mg) by mouth every 6 hours as needed for anxiety or sleep Take 30-60 minutes before procedure.  Do not operate a vehicle after taking this medication. (Patient not taking: Reported on 12/27/2017) 10 tablet 0       Review Of Systems:    Skin: negative  Respiratory: No shortness of breath, dyspnea on exertion, cough, or hemoptysis  Cardiovascular: negative  Gastrointestinal: as above  Genitourinary: negative  Musculoskeletal: back pain  Neurologic: negative  Psychiatric: negative    Objective:   /67 (BP Location: Left arm, Patient Position: Supine, Cuff Size: Adult Regular)  Pulse 58  Temp 96.5  F (35.8  C) (Tympanic)  Ht 5' 5.5\" (1.664 m)  Wt 150 lb (68 kg)  SpO2 96%  BMI 24.58 kg/m2  EXAM:  Constitutional: alert and no distress   Cardiovascular:  RRR. No murmurs, clicks gallops or rub  Respiratory: Lungs clear  Psychiatric: mentation appears normal and affect normal/bright  Head: Normocephalic. No masses, lesions, tenderness or abnormalities  Abdomen: Abdomen soft, non-tender. BS normal. No masses, organomegaly  NEURO: NO focal neurologic deficits noted.    SKIN: no suspicious lesions or rashes       Orders placed or performed in visit on 12/21/17     HYDROcodone-acetaminophen " (NORCO)  MG per tablet       Assessment and Plan:    (C91.10) Chronic lymphocytic leukemia (H)  (primary encounter diagnosis)  Comment: CLL.  Slight increase in abdominal lymph nodes noted on CT scan.  WBC remains in normal range.    Plan: Heme/Onc follow up already scheduled.      (R10.84) Abdominal pain, generalized  Comment: CT and labs reviewed.  Pain has resolved.  NO clear cause to his pain.  Unlikely referred back pain.    Plan: If recurs consider US and lipase.      (G89.29) Other chronic pain  Comment: He had many questions regarding the refill process today.  He was educated and we discussed this in detail.    Plan: Chronic narcotics.  140# per month.      25 minutes was spent on this patient's care today.  Greater than 50% of the time was spent face to face with the patient.  We discussed the refill process and that calling on the day I am in [facility] may not necessarily work well.  We discussed his abdominal pain and the ED evaluation.  We also discussed the outstanding possibility of gallbladder disease or pancreatitis.        Yosvany Schwab, DO

## 2017-12-27 NOTE — NURSING NOTE
"Chief Complaint   Patient presents with     RECHECK     f/u ER visit- abdomenal pain- [facility]- middle of stomach - no pain now        Initial /67 (BP Location: Left arm, Patient Position: Supine, Cuff Size: Adult Regular)  Pulse 58  Temp 96.5  F (35.8  C) (Tympanic)  Ht 5' 5.5\" (1.664 m)  Wt 150 lb (68 kg)  SpO2 96%  BMI 24.58 kg/m2 Estimated body mass index is 24.58 kg/(m^2) as calculated from the following:    Height as of this encounter: 5' 5.5\" (1.664 m).    Weight as of this encounter: 150 lb (68 kg).  Medication Reconciliation: complete   Ivania Bronson LPN      "

## 2018-01-22 DIAGNOSIS — G89.29 CHRONIC LOW BACK PAIN WITH SCIATICA, SCIATICA LATERALITY UNSPECIFIED, UNSPECIFIED BACK PAIN LATERALITY: ICD-10-CM

## 2018-01-22 DIAGNOSIS — M54.40 CHRONIC LOW BACK PAIN WITH SCIATICA, SCIATICA LATERALITY UNSPECIFIED, UNSPECIFIED BACK PAIN LATERALITY: ICD-10-CM

## 2018-01-22 NOTE — TELEPHONE ENCOUNTER
Controlled Substance Refill Request for Norco  Problem List Complete:  Yes    Last Written Prescription Date:  12/21/17  Last Fill Quantity: 140,   # refills: 0    Last Office Visit with [facility] primary care provider: 12/27/17    Controlled substance agreement on file: Yes:  Date 5/2/17.     Processing:  Patient will  in clinic  PCP Zaheer.  Thank you.

## 2018-01-23 RX ORDER — HYDROCODONE BITARTRATE AND ACETAMINOPHEN 10; 325 MG/1; MG/1
1 TABLET ORAL EVERY 6 HOURS PRN
Qty: 140 TABLET | Refills: 0 | Status: SHIPPED | OUTPATIENT
Start: 2018-01-23 | End: 2018-02-19

## 2018-02-19 DIAGNOSIS — M54.40 CHRONIC LOW BACK PAIN WITH SCIATICA, SCIATICA LATERALITY UNSPECIFIED, UNSPECIFIED BACK PAIN LATERALITY: ICD-10-CM

## 2018-02-19 DIAGNOSIS — G89.29 CHRONIC LOW BACK PAIN WITH SCIATICA, SCIATICA LATERALITY UNSPECIFIED, UNSPECIFIED BACK PAIN LATERALITY: ICD-10-CM

## 2018-02-21 RX ORDER — HYDROCODONE BITARTRATE AND ACETAMINOPHEN 10; 325 MG/1; MG/1
TABLET ORAL
Qty: 140 TABLET | Refills: 0 | Status: SHIPPED | OUTPATIENT
Start: 2018-02-21 | End: 2018-03-19

## 2018-03-19 DIAGNOSIS — M54.40 CHRONIC LOW BACK PAIN WITH SCIATICA, SCIATICA LATERALITY UNSPECIFIED, UNSPECIFIED BACK PAIN LATERALITY: ICD-10-CM

## 2018-03-19 DIAGNOSIS — G89.29 CHRONIC LOW BACK PAIN WITH SCIATICA, SCIATICA LATERALITY UNSPECIFIED, UNSPECIFIED BACK PAIN LATERALITY: ICD-10-CM

## 2018-03-19 NOTE — TELEPHONE ENCOUNTER
Controlled Substance Refill Request for Norco  Problem List Complete:  Yes    Last Written Prescription Date:  2/21/18  Last Fill Quantity: 140,   # refills: 0    Last Office Visit with [facility] primary care provider: 12/27/17    Clinic visit frequency required: Q 1 months     Future Office visit:     Controlled substance agreement on file: Yes:  Date 4/24/17.     Processing:  [facility]   checked in past 6 months?  Yes 4/14/17

## 2018-03-20 RX ORDER — HYDROCODONE BITARTRATE AND ACETAMINOPHEN 10; 325 MG/1; MG/1
TABLET ORAL
Qty: 140 TABLET | Refills: 0 | Status: SHIPPED | OUTPATIENT
Start: 2018-03-20 | End: 2018-04-24

## 2018-03-21 DIAGNOSIS — I10 BENIGN ESSENTIAL HYPERTENSION: ICD-10-CM

## 2018-03-21 RX ORDER — FOSINOPRIL SODIUM 20 MG/1
20 TABLET ORAL DAILY
Qty: 90 TABLET | Refills: 3 | OUTPATIENT
Start: 2018-03-21

## 2018-03-21 NOTE — TELEPHONE ENCOUNTER
fosinopril (MONOPRIL) 20 MG tablet  Last Written Prescription Date:  7/24/17  Last Fill Quantity: 90,   # refills: 3  Last Office Visit: 12/27/17  Future Office visit:

## 2018-03-23 DIAGNOSIS — I10 BENIGN ESSENTIAL HYPERTENSION: ICD-10-CM

## 2018-03-23 RX ORDER — FOSINOPRIL SODIUM 20 MG/1
20 TABLET ORAL DAILY
Qty: 90 TABLET | Refills: 3 | Status: SHIPPED | OUTPATIENT
Start: 2018-03-23 | End: 2019-01-25

## 2018-03-23 NOTE — TELEPHONE ENCOUNTER
Mail was dropped off by [facility] stating Tari wants a refill of his Fosinopril Sodium 40mg. He states he is on 20mg and our documentation also states he is on 20mg. Please sign prescription for 20mg. Thank you. Ivania Bronson LPN

## 2018-04-24 ENCOUNTER — TELEPHONE (OUTPATIENT)
Dept: FAMILY MEDICINE | Facility: OTHER | Age: 83
End: 2018-04-24

## 2018-04-24 DIAGNOSIS — M54.40 CHRONIC LOW BACK PAIN WITH SCIATICA, SCIATICA LATERALITY UNSPECIFIED, UNSPECIFIED BACK PAIN LATERALITY: ICD-10-CM

## 2018-04-24 DIAGNOSIS — G89.29 CHRONIC LOW BACK PAIN WITH SCIATICA, SCIATICA LATERALITY UNSPECIFIED, UNSPECIFIED BACK PAIN LATERALITY: ICD-10-CM

## 2018-04-24 RX ORDER — HYDROCODONE BITARTRATE AND ACETAMINOPHEN 10; 325 MG/1; MG/1
TABLET ORAL
Qty: 140 TABLET | Refills: 0 | Status: SHIPPED | OUTPATIENT
Start: 2018-04-24 | End: 2018-05-22

## 2018-04-24 NOTE — TELEPHONE ENCOUNTER
kyco      Last Written Prescription Date:  3/20/18  Last Fill Quantity: 140,   # refills: 0-  Last Office Visit: 12/27/1  Future Office visit:       Routing refill request to provider for review/approval because:  Drug not on the [facility], [facility] or [facility] refill protocol or controlled substance

## 2018-05-22 DIAGNOSIS — M54.40 CHRONIC LOW BACK PAIN WITH SCIATICA, SCIATICA LATERALITY UNSPECIFIED, UNSPECIFIED BACK PAIN LATERALITY: ICD-10-CM

## 2018-05-22 DIAGNOSIS — G89.29 CHRONIC LOW BACK PAIN WITH SCIATICA, SCIATICA LATERALITY UNSPECIFIED, UNSPECIFIED BACK PAIN LATERALITY: ICD-10-CM

## 2018-05-22 RX ORDER — HYDROCODONE BITARTRATE AND ACETAMINOPHEN 10; 325 MG/1; MG/1
TABLET ORAL
Qty: 140 TABLET | Refills: 0 | Status: SHIPPED | OUTPATIENT
Start: 2018-05-22 | End: 2018-06-20

## 2018-05-22 NOTE — TELEPHONE ENCOUNTER
Pt notified prescription ready to  at [facility]. Ivania Bronson LPN

## 2018-05-22 NOTE — TELEPHONE ENCOUNTER
Received a refill request for [place] from [facility]. Ivania Bronson LPN

## 2018-06-20 DIAGNOSIS — M54.40 CHRONIC LOW BACK PAIN WITH SCIATICA, SCIATICA LATERALITY UNSPECIFIED, UNSPECIFIED BACK PAIN LATERALITY: ICD-10-CM

## 2018-06-20 DIAGNOSIS — M54.40 MIDLINE LOW BACK PAIN WITH SCIATICA, SCIATICA LATERALITY UNSPECIFIED, UNSPECIFIED CHRONICITY: ICD-10-CM

## 2018-06-20 DIAGNOSIS — G89.29 CHRONIC LOW BACK PAIN WITH SCIATICA, SCIATICA LATERALITY UNSPECIFIED, UNSPECIFIED BACK PAIN LATERALITY: ICD-10-CM

## 2018-06-20 RX ORDER — DIAZEPAM 10 MG
10 TABLET ORAL EVERY 6 HOURS PRN
Qty: 10 TABLET | Refills: 0 | Status: SHIPPED | OUTPATIENT
Start: 2018-06-20 | End: 2019-03-13

## 2018-06-20 RX ORDER — HYDROCODONE BITARTRATE AND ACETAMINOPHEN 10; 325 MG/1; MG/1
TABLET ORAL
Qty: 140 TABLET | Refills: 0 | Status: SHIPPED | OUTPATIENT
Start: 2018-06-20 | End: 2018-07-26

## 2018-06-20 NOTE — TELEPHONE ENCOUNTER
Pt notified bot prescriptions ready to be picked up at [facility]. Ivania Bronson LPN

## 2018-06-20 NOTE — TELEPHONE ENCOUNTER
valium      Last Written Prescription Date:  3/22/18  Last Fill Quantity: 10,   # refills: 0  Last Office Visit: 12/27/17  Future Office visit:       Routing refill request to provider for review/approval because:  Drug not on the [facility], [facility] or [facility] refill protocol or controlled substance  norco      Last Written Prescription Date:  5/22/18  Last Fill Quantity: 140,   # refills: 0  Last Office Visit: 12/27/17  Future Office visit:       Routing refill request to provider for review/approval because:  Drug not on the [facility], [facility] or [facility] refill protocol or controlled substance

## 2018-07-26 ENCOUNTER — TELEPHONE (OUTPATIENT)
Dept: INTERNAL MEDICINE | Facility: OTHER | Age: 83
End: 2018-07-26

## 2018-07-26 DIAGNOSIS — M54.40 CHRONIC LOW BACK PAIN WITH SCIATICA, SCIATICA LATERALITY UNSPECIFIED, UNSPECIFIED BACK PAIN LATERALITY: ICD-10-CM

## 2018-07-26 DIAGNOSIS — G89.29 CHRONIC LOW BACK PAIN WITH SCIATICA, SCIATICA LATERALITY UNSPECIFIED, UNSPECIFIED BACK PAIN LATERALITY: ICD-10-CM

## 2018-07-26 RX ORDER — HYDROCODONE BITARTRATE AND ACETAMINOPHEN 10; 325 MG/1; MG/1
TABLET ORAL
Qty: 140 TABLET | Refills: 0 | Status: SHIPPED | OUTPATIENT
Start: 2018-07-26 | End: 2018-08-21

## 2018-07-26 NOTE — TELEPHONE ENCOUNTER
Reason for call:  Medication    1. Medication Name? HYDROcodone-acetaminophen (NORCO)  MG per tablet  2. Is this request for a refill? Yes  3. What Pharmacy do you use? [facility] pharmacy  4. Have you contacted your pharmacy? Yes    5. If yes, when? Last week  (Please note that the turn-around-time for prescriptions is 72 business hours; I am sending your request at this time. SEND TO  [facility]  )  Description: Pt called and stated that he contacted his pharmacy to get this medication refilled and the pharmacy hasn't gotten anything yet.   Was an appointment offered for this a call? No   Preferred method for responding to this messageTelephone Call [phone]  If we cannot reach you directly, may we leave a detailed response at the number you provided? Yes  Can this message wait until your PCP/Provider returns if not available today? Yes

## 2018-07-26 NOTE — TELEPHONE ENCOUNTER
kyco      Last Written Prescription Date:  6/20/18  Last Fill Quantity: 140,   # refills: 0  Last Office Visit: 12/27/17  Future Office visit:       Routing refill request to provider for review/approval because:  Drug not on the [facility], [facility] or [facility] refill protocol or controlled substance

## 2018-07-31 NOTE — TELEPHONE ENCOUNTER
Controlled Substance Refill Request for Norco  Problem List Complete:  Yes    Last Written Prescription Date:  1/23/18  Last Fill Quantity: 140,   # refills: 0    Last Office Visit with [facility] primary care provider: 12/27/17    Chronic pain syndrome         Problem Detail      Noted:  3/5/2015      Priority:  Medium      Overview Addendum 6/13/2017  9:30 AM by Lorena Lisa RN     Patient is followed by Yosvany Schwab DO for ongoing prescription of pain medication.  All refills should only be approved by this provider, or covering partner.     Medication(s): Norco 10/325mg.   Maximum quantity per month: 140  Clinic visit frequency required: Q 1 month      Controlled substance agreement:  Encounter-Level CSA - 04/24/2017:                     Controlled Substance Agreement - Scan on 5/2/2017  8:21 AM : CONTROLLED SUBSTANCE AGREEMENT (below)         Pain Clinic evaluation in the past: No     DIRE Total Score(s):  No flowsheet data found.     Last [facility] website verification:  done on 4.14.17   [email or web address]       Future Office visit:  None scheduled        Processing:  Patient will  in clinic       
Notified pt Norco script ready to be picked up at [facility] . Ivania Bronson LPN    
Prescription picked up byspouse ID Verified    
31-Jul-2018 20:33

## 2018-08-21 DIAGNOSIS — G89.29 CHRONIC LOW BACK PAIN WITH SCIATICA, SCIATICA LATERALITY UNSPECIFIED, UNSPECIFIED BACK PAIN LATERALITY: ICD-10-CM

## 2018-08-21 DIAGNOSIS — M54.40 CHRONIC LOW BACK PAIN WITH SCIATICA, SCIATICA LATERALITY UNSPECIFIED, UNSPECIFIED BACK PAIN LATERALITY: ICD-10-CM

## 2018-08-21 RX ORDER — HYDROCODONE BITARTRATE AND ACETAMINOPHEN 10; 325 MG/1; MG/1
TABLET ORAL
Qty: 140 TABLET | Refills: 0 | Status: SHIPPED | OUTPATIENT
Start: 2018-08-21 | End: 2018-09-24

## 2018-08-21 NOTE — TELEPHONE ENCOUNTER
Controlled Substance Refill Request for Norco  Problem List Complete:  Yes    Last Written Prescription Date:  7/26/18  Last Fill Quantity: 140,   # refills: 0    Last Office Visit with [facility] primary care provider: 12/27/17  Chronic pain syndrome         Problem Detail      Noted:  3/5/2015      Priority:  Medium      Overview Addendum 6/13/2017  9:30 AM by Lorena Lisa RN     Patient is followed by Yosvany Schwab DO for ongoing prescription of pain medication.  All refills should only be approved by this provider, or covering partner.     Medication(s): Norco 10/325mg.   Maximum quantity per month: 140  Clinic visit frequency required: Q 1 month      Controlled substance agreement:  Encounter-Level CSA - 04/24/2017:                     Controlled Substance Agreement - Scan on 5/2/2017  8:21 AM : CONTROLLED SUBSTANCE AGREEMENT (below)         Pain Clinic evaluation in the past: No     DIRE Total Score(s):  No flowsheet data found.     Last [facility] website verification:  done on 4.14.17   [email or web address]        Processing:  Patient will  in clinic

## 2018-08-22 NOTE — TELEPHONE ENCOUNTER
Pt notified norco ready to be picked up at  [facility]. Ivania Bronson LPN

## 2018-09-24 DIAGNOSIS — G89.29 CHRONIC LOW BACK PAIN WITH SCIATICA, SCIATICA LATERALITY UNSPECIFIED, UNSPECIFIED BACK PAIN LATERALITY: ICD-10-CM

## 2018-09-24 DIAGNOSIS — M54.40 CHRONIC LOW BACK PAIN WITH SCIATICA, SCIATICA LATERALITY UNSPECIFIED, UNSPECIFIED BACK PAIN LATERALITY: ICD-10-CM

## 2018-09-24 RX ORDER — HYDROCODONE BITARTRATE AND ACETAMINOPHEN 10; 325 MG/1; MG/1
TABLET ORAL
Qty: 140 TABLET | Refills: 0 | Status: SHIPPED | OUTPATIENT
Start: 2018-09-24 | End: 2018-10-23

## 2018-09-24 NOTE — TELEPHONE ENCOUNTER
Controlled Substance Refill Request for Norco  Problem List Complete:  Yes    Last Written Prescription Date:  8/21/18  Last Fill Quantity: 140,   # refills: 0    [facility] Office Visit with [facility] [place] care provider: 12/27/17  Chronic pain syndrome         Problem Detail      Noted:  3/5/2015      Priority:  Medium      Overview Addendum 6/13/2017  9:30 AM by Lorena Lisa RN     Patient is followed by Yosvany Schwab DO for ongoing prescription of pain medication.  All refills should only be approved by this provider, or covering partner.     Medication(s): Norco 10/325mg.   Maximum quantity per month: 140  Clinic visit frequency required: Q 1 month      Controlled substance agreement:  Encounter-Level CSA - 04/24/2017:                     Controlled Substance Agreement - Scan on 5/2/2017  8:21 AM : CONTROLLED SUBSTANCE AGREEMENT (below)         Pain Clinic evaluation in the past: No     DIRE Total Score(s):  No flowsheet data found.     Last [facility] website verification:  done on 4.14.17   [email or web address]           Processing:  Patient will  in clinic

## 2018-10-02 DIAGNOSIS — I10 BENIGN ESSENTIAL HYPERTENSION: ICD-10-CM

## 2018-10-02 NOTE — TELEPHONE ENCOUNTER
atenolol (TENORMIN) 50 MG tablet  Last Written Prescription Date:  8/10/17  Last Fill Quantity: 90,   # refills: 3  Last Office Visit: 12/27/17  Future Office visit:

## 2018-10-04 RX ORDER — ATENOLOL 50 MG/1
50 TABLET ORAL DAILY
Qty: 90 TABLET | Refills: 1 | Status: SHIPPED | OUTPATIENT
Start: 2018-10-04 | End: 2019-07-02

## 2018-10-23 DIAGNOSIS — M54.40 CHRONIC LOW BACK PAIN WITH SCIATICA, SCIATICA LATERALITY UNSPECIFIED, UNSPECIFIED BACK PAIN LATERALITY: ICD-10-CM

## 2018-10-23 DIAGNOSIS — G89.29 CHRONIC LOW BACK PAIN WITH SCIATICA, SCIATICA LATERALITY UNSPECIFIED, UNSPECIFIED BACK PAIN LATERALITY: ICD-10-CM

## 2018-10-23 RX ORDER — HYDROCODONE BITARTRATE AND ACETAMINOPHEN 10; 325 MG/1; MG/1
TABLET ORAL
Qty: 140 TABLET | Refills: 0 | Status: SHIPPED | OUTPATIENT
Start: 2018-10-23 | End: 2018-11-20

## 2018-10-23 NOTE — TELEPHONE ENCOUNTER
Norco       Last Written Prescription Date:  9/24/2018  Last Fill Quantity: 140,   # refills: 0  Last Office Visit: 12/27/2017  Future Office visit:       Routing refill request to provider for review/approval because:  Drug not on the [facility], [facility] or [facility] refill protocol or controlled substance

## 2018-11-20 DIAGNOSIS — G89.29 CHRONIC LOW BACK PAIN WITH SCIATICA, SCIATICA LATERALITY UNSPECIFIED, UNSPECIFIED BACK PAIN LATERALITY: ICD-10-CM

## 2018-11-20 DIAGNOSIS — M54.40 CHRONIC LOW BACK PAIN WITH SCIATICA, SCIATICA LATERALITY UNSPECIFIED, UNSPECIFIED BACK PAIN LATERALITY: ICD-10-CM

## 2018-11-20 NOTE — TELEPHONE ENCOUNTER
ABDIFATAHCOM      Last Written Prescription Date:  10/23/18  Last Fill Quantity: 140,   # refills: 0  Last Office Visit: 12/27/17  Future Office visit:       Routing refill request to provider for review/approval because:  Drug not on the [facility], [facility] or [facility] refill protocol or controlled substance

## 2018-11-21 RX ORDER — HYDROCODONE BITARTRATE AND ACETAMINOPHEN 10; 325 MG/1; MG/1
TABLET ORAL
Qty: 140 TABLET | Refills: 0 | Status: SHIPPED | OUTPATIENT
Start: 2018-11-21 | End: 2018-12-21

## 2018-11-21 NOTE — TELEPHONE ENCOUNTER
Pt notified prescription ready to be picked up at the [facility]. Ivania Bronson LPN

## 2018-12-21 DIAGNOSIS — G89.29 CHRONIC LOW BACK PAIN WITH SCIATICA, SCIATICA LATERALITY UNSPECIFIED, UNSPECIFIED BACK PAIN LATERALITY: ICD-10-CM

## 2018-12-21 DIAGNOSIS — M54.40 CHRONIC LOW BACK PAIN WITH SCIATICA, SCIATICA LATERALITY UNSPECIFIED, UNSPECIFIED BACK PAIN LATERALITY: ICD-10-CM

## 2018-12-21 RX ORDER — HYDROCODONE BITARTRATE AND ACETAMINOPHEN 10; 325 MG/1; MG/1
TABLET ORAL
Qty: 140 TABLET | Refills: 0 | Status: SHIPPED | OUTPATIENT
Start: 2018-12-21 | End: 2018-12-26

## 2018-12-21 NOTE — TELEPHONE ENCOUNTER
HYDROcodone-acetaminophen (NORCO)  MG per tablet  Last Written Prescription Date:  11/21/18  Last Fill Quantity: 140,   # refills: 0  Last Office Visit: 12/27/17  Future Office visit:       Routing refill request to provider for review/approval because:  Drug not on the [facility], [facility] or [facility] refill protocol or controlled substance

## 2018-12-26 ENCOUNTER — TELEPHONE (OUTPATIENT)
Dept: INTERNAL MEDICINE | Facility: OTHER | Age: 83
End: 2018-12-26

## 2018-12-26 DIAGNOSIS — G89.29 CHRONIC LOW BACK PAIN WITH SCIATICA, SCIATICA LATERALITY UNSPECIFIED, UNSPECIFIED BACK PAIN LATERALITY: ICD-10-CM

## 2018-12-26 DIAGNOSIS — M54.40 CHRONIC LOW BACK PAIN WITH SCIATICA, SCIATICA LATERALITY UNSPECIFIED, UNSPECIFIED BACK PAIN LATERALITY: ICD-10-CM

## 2018-12-26 RX ORDER — HYDROCODONE BITARTRATE AND ACETAMINOPHEN 10; 325 MG/1; MG/1
TABLET ORAL
Qty: 140 TABLET | Refills: 0 | Status: SHIPPED | OUTPATIENT
Start: 2018-12-26 | End: 2019-01-22

## 2018-12-31 ENCOUNTER — TELEPHONE (OUTPATIENT)
Dept: INTERNAL MEDICINE | Facility: OTHER | Age: 83
End: 2018-12-31

## 2019-01-10 DIAGNOSIS — R10.84 CHRONIC GENERALIZED ABDOMINAL PAIN: ICD-10-CM

## 2019-01-10 DIAGNOSIS — G89.29 CHRONIC GENERALIZED ABDOMINAL PAIN: ICD-10-CM

## 2019-01-10 NOTE — TELEPHONE ENCOUNTER
zantac  Last Written Prescription Date: 12/15/17  Last Fill Quantity: 600mL # of Refills: 5  Last Office Visit: 12/27/17

## 2019-01-22 DIAGNOSIS — G89.29 CHRONIC LOW BACK PAIN WITH SCIATICA, SCIATICA LATERALITY UNSPECIFIED, UNSPECIFIED BACK PAIN LATERALITY: ICD-10-CM

## 2019-01-22 DIAGNOSIS — M54.40 CHRONIC LOW BACK PAIN WITH SCIATICA, SCIATICA LATERALITY UNSPECIFIED, UNSPECIFIED BACK PAIN LATERALITY: ICD-10-CM

## 2019-01-22 RX ORDER — HYDROCODONE BITARTRATE AND ACETAMINOPHEN 10; 325 MG/1; MG/1
TABLET ORAL
Qty: 140 TABLET | Refills: 0 | Status: SHIPPED | OUTPATIENT
Start: 2019-01-22 | End: 2019-02-20

## 2019-01-22 NOTE — TELEPHONE ENCOUNTER
Pt notified script ready to be picked up at the [facility]. Ivania Bronson LPN

## 2019-01-25 ENCOUNTER — TELEPHONE (OUTPATIENT)
Dept: INTERNAL MEDICINE | Facility: OTHER | Age: 84
End: 2019-01-25

## 2019-01-25 DIAGNOSIS — I10 BENIGN ESSENTIAL HYPERTENSION: ICD-10-CM

## 2019-01-25 RX ORDER — FOSINOPRIL SODIUM 20 MG/1
20 TABLET ORAL DAILY
Qty: 90 TABLET | Refills: 0 | Status: SHIPPED | OUTPATIENT
Start: 2019-01-25 | End: 2019-04-24

## 2019-01-25 NOTE — TELEPHONE ENCOUNTER
Patient called to state that his PCP/nurse needs to call [facility] in regards to his medications.  He was adamant et hung up after giving me that information.

## 2019-01-25 NOTE — TELEPHONE ENCOUNTER
Pt needs refill of fosinopril sent to [facility] mail order- updated fax number with mail order service. Ivania Bronson LPN

## 2019-02-20 DIAGNOSIS — M54.40 CHRONIC LOW BACK PAIN WITH SCIATICA, SCIATICA LATERALITY UNSPECIFIED, UNSPECIFIED BACK PAIN LATERALITY: ICD-10-CM

## 2019-02-20 DIAGNOSIS — G89.29 CHRONIC LOW BACK PAIN WITH SCIATICA, SCIATICA LATERALITY UNSPECIFIED, UNSPECIFIED BACK PAIN LATERALITY: ICD-10-CM

## 2019-02-20 RX ORDER — HYDROCODONE BITARTRATE AND ACETAMINOPHEN 10; 325 MG/1; MG/1
TABLET ORAL
Qty: 140 TABLET | Refills: 0 | Status: SHIPPED | OUTPATIENT
Start: 2019-02-20 | End: 2019-03-13

## 2019-02-20 NOTE — TELEPHONE ENCOUNTER
Pt notified script ready to be picked up from [facility]. Ivania Bronson LPN

## 2019-02-20 NOTE — TELEPHONE ENCOUNTER
HYDROcodone-acetaminophen (NORCO)  MG per tablet  Last Written Prescription Date:  1/22/19  Last Fill Quantity: 140,   # refills: 0  Last Office Visit: 12/27/17  Future Office visit:       Routing refill request to provider for review/approval because:  Drug not on the [facility], [facility] or [facility] refill protocol or controlled substance

## 2019-03-06 DIAGNOSIS — R10.84 CHRONIC GENERALIZED ABDOMINAL PAIN: ICD-10-CM

## 2019-03-06 DIAGNOSIS — G89.29 CHRONIC GENERALIZED ABDOMINAL PAIN: ICD-10-CM

## 2019-03-06 NOTE — LETTER
March 7, 2019      Javier Amador  [address] [place] [address]        Dear Javier,     APPOINTMENT REMINDER:   Our records indicates that it is time for you to be seen for a yearly follow up appointment.      Your current medication request for Zantac will be approved for one refill but you will need to be seen before any additional refills can be approved.  Taking care of your health is important to us, and ongoing visits with your provider are vital to your care.    We look forward to seeing you in the near future.  You may call our office at [phone] to schedule a visit.     Please disregard this notice if you have already made an appointment.      Sincerely,  Yosvany Schwab, DO

## 2019-03-07 NOTE — TELEPHONE ENCOUNTER
ranitidine (ZANTAC) 75 MG/5ML syrup      Last Written Prescription Date:  1/10/19  Last Fill Quantity: 600mL,   # refills: 0  Last Office Visit: 12/27/17  Future Office visit:       Routing refill request to provider for review/approval because:  Pt due for office visit. Letter sent. Please advise. Thank you!

## 2019-03-12 NOTE — PROGRESS NOTES
SUBJECTIVE:   Javier Amador is a 83 year old male who presents to clinic today for the following health issues:      Hypertension Follow-up      Outpatient blood pressures are being checked at home.  Results are good.    Low Salt Diet: not monitoring salt    Chronic Pain Follow-Up       Type / Location of Pain: chronic lateral lower back pain with sciatica   Analgesia/pain control:       Recent changes:  same      Overall control: Tolerable with discomfort  Activity level/function:      Daily activities:  Unable to perform most daily activities - chores, hobbies, social activities, driving    Work:  not applicable  Adverse effects:  Yes unable to do anything  Adherance    Taking medication as directed?  Yes    Participating in other treatments: yes  Risk Factors:    Sleep:  Good    Mood/anxiety:  controlled    Recent family or social stressors:  none noted    Other aggravating factors: prolonged standing and poor posture  PHQ-9 SCORE 1/23/2017 2/22/2017 12/27/2017   PHQ-9 Total Score 3 0 1     FREDDIE-7 SCORE 1/23/2017 2/22/2017   Total Score 1 1     Encounter-Level CSA - 04/24/2017:    Controlled Substance Agreement - Scan on 5/2/2017  8:21 AM: CONTROLLED SUBSTANCE AGREEMENT (below)       Patient-Level CSA:    There are no patient-level csa.         Amount of exercise or physical activity: None    Problems taking medications regularly: No    Medication side effects: none    Diet: regular (no restrictions)    Javier presents today for follow up. He states he will be moving to the [facility] To be with his daughter.  He sold his house and plans on moving in late march.  He does request final refills of his Valium and Norco today.   Bud also reports having a cough and some chest congestion that started last Sunday.  He reports some sputum production.  No fever and no chills.        Problem list and histories reviewed & adjusted, as indicated.  Additional history: as documented    Patient Active Problem List    Diagnosis     Acute gouty arthritis     Generalized osteoarthrosis, unspecified site     CLL (chronic lymphocytic leukemia) (H)     HTN (hypertension)     Hypercholesterolemia     Bilateral low back pain with sciatica, sciatica laterality unspecified     Chronic pain syndrome     Osteoarthritis of multiple joints     ACP (advance care planning)     Past Surgical History:   Procedure Laterality Date     ABDOMEN SURGERY  1960    left inguinal herniorrhaphy     ABDOMEN SURGERY  6-5-1997    radical retropubic prostatectomy w/pelvic lymph node dissection     BIOPSY  04-    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     ENT SURGERY  2014    cyst removed from left ear tip     EYE SURGERY  6-1-2009    removal of right cataract, implant multifocal IOL     EYE SURGERY  7-    removal of left cataract implant of tecnic multifocal IOL     HEAD & NECK SURGERY  1941    tonsillectomy     HEAD & NECK SURGERY  8-    removal of Warthin's tumor right neck     ORTHOPEDIC SURGERY      broken left ulna     ORTHOPEDIC SURGERY      broken left radius     ORTHOPEDIC SURGERY      fx left humerus     ORTHOPEDIC SURGERY      left clavicle     ORTHOPEDIC SURGERY      right radius     ORTHOPEDIC SURGERY      ribs, right 3rd and 4th X3     ORTHOPEDIC SURGERY      dislocation right shoulder X6      ORTHOPEDIC SURGERY      bursitis right shoulder      ORTHOPEDIC SURGERY      torn rotator cuff left shoulder     ORTHOPEDIC SURGERY      spondylolisthesis sporadic spondylodynia L4-L5     ORTHOPEDIC SURGERY      compression fx T-3, 5, 7, 9, 10     ORTHOPEDIC SURGERY      dislocation right hip, sciatic nerve     ORTHOPEDIC SURGERY  10-2-2011    fx left 2nd toe     SOFT TISSUE SURGERY  09-    removal of small hard cyst left neck       Social History     Tobacco Use     Smoking status: Former Smoker     Packs/day: 0.50     Years: 70.00     Pack years: 35.00     Last attempt to quit: 2/28/2016     Years since quitting: 3.0      Smokeless tobacco: Never Used   Substance Use Topics     Alcohol use: No     Alcohol/week: 0.0 oz     Comment: occassional beer     Family History   Problem Relation Age of Onset     Cancer Mother          age 86     Other - See Comments Mother 98        renal failure     Cancer Brother          Current Outpatient Medications   Medication Sig Dispense Refill     amLODIPine (NORVASC) 10 MG tablet TAKE 1 TABLET DAILY 90 tablet 0     atenolol (TENORMIN) 50 MG tablet Take 1 tablet (50 mg) by mouth daily 90 tablet 1     azithromycin (ZITHROMAX) 250 MG tablet Take 2 tablets (500 mg) by mouth daily for 1 day, THEN 1 tablet (250 mg) daily for 9 days. 11 tablet 0     diazepam (VALIUM) 10 MG tablet Take 1 tablet (10 mg) by mouth every 6 hours as needed for anxiety or sleep Take 30-60 minutes before procedure.  Do not operate a vehicle after taking this medication. 10 tablet 0     folic acid (FOLVITE) 400 MCG tablet Take 400 mcg by mouth       fosinopril (MONOPRIL) 20 MG tablet Take 1 tablet (20 mg) by mouth daily 90 tablet 0     [START ON 3/20/2019] HYDROcodone-acetaminophen (NORCO)  MG per tablet Take 1 Tab by mouth every six hours as needed for moderate to severe pain. May take 1 extra tab per day - total of 5 tablets per day. 140 tablet 0     ranitidine (ZANTAC) 75 MG/5ML syrup TAKE 8ML (120MG) TWO TIMES A  mL 0     Allergies   Allergen Reactions     No Clinical Screening - See Comments      Zomax anaphylaxis,  Off the market due to high death rate      Talwin [Pentazocine] Visual Disturbance     BP Readings from Last 3 Encounters:   19 104/56   17 120/67   17 118/82    Wt Readings from Last 3 Encounters:   19 68 kg (150 lb)   17 68 kg (150 lb)   17 72.1 kg (159 lb)           Reviewed and updated as needed this visit by clinical staff       Reviewed and updated as needed this visit by Provider       ROS:  Constitutional, HEENT, cardiovascular, pulmonary, gi and gu  "systems are negative, except as otherwise noted.    OBJECTIVE:     /56 (BP Location: Right arm, Patient Position: Sitting, Cuff Size: Adult Regular)   Pulse 74   Temp 98.8  F (37.1  C) (Tympanic)   Resp 14   Ht 1.651 m (5' 5\")   Wt 68 kg (150 lb)   SpO2 95%   BMI 24.96 kg/m    Body mass index is 24.96 kg/m .   GENERAL: Alert and no distress  HENT: ear canals and TM's normal, nose and mouth without ulcers or lesions  NECK: no adenopathy, no asymmetry, masses, or scars and thyroid normal to palpation  RESP: Generally CTA with soft expiratory crackle sin LLL.    CV: regular rate and rhythm, normal S1 S2, no S3 or S4, no murmur, click or rub, no peripheral edema and peripheral pulses strong  MS: Kyphotic posture   SKIN: no suspicious lesions or rashes  PSYCH: mentation appears normal, affect normal/bright    Diagnostic Test Results:  No results found for this or any previous visit (from the past 24 hour(s)).  Results for orders placed or performed in visit on 12/22/17   Potassium   Result Value Ref Range    Potassium 3.7 3.4 - 5.1 mEq/L    [facility]   Creatinine   Result Value Ref Range    Creatinine 1.12 0.70 - 1.20 mg/dL    [facility]   Glucose   Result Value Ref Range    Glucose 191 (H) 70 - 100 mg/dL    [facility]       ASSESSMENT/PLAN:     Problem List Items Addressed This Visit     None      Visit Diagnoses     Bronchitis    -  Primary    Relevant Medications    azithromycin (ZITHROMAX) 250 MG tablet x 10 days    Chronic low back pain with sciatica, sciatica laterality unspecified, unspecified back pain laterality        Relevant Medications    HYDROcodone-acetaminophen (NORCO)  MG per tablet (Start on 3/20/2019)    diazepam (VALIUM) 10 MG tablet    Midline low back pain with sciatica, sciatica laterality unspecified, unspecified chronicity        Relevant Medications    HYDROcodone-acetaminophen (NORCO)  MG per tablet (Start on " 3/20/2019)    diazepam (VALIUM) 10 MG tablet           Yosvany Schwab,   [facility]

## 2019-03-13 ENCOUNTER — OFFICE VISIT (OUTPATIENT)
Dept: INTERNAL MEDICINE | Facility: OTHER | Age: 84
End: 2019-03-13
Attending: INTERNAL MEDICINE
Payer: MEDICARE

## 2019-03-13 VITALS
TEMPERATURE: 98.8 F | HEART RATE: 74 BPM | OXYGEN SATURATION: 95 % | WEIGHT: 150 LBS | RESPIRATION RATE: 14 BRPM | BODY MASS INDEX: 24.99 KG/M2 | SYSTOLIC BLOOD PRESSURE: 104 MMHG | DIASTOLIC BLOOD PRESSURE: 56 MMHG | HEIGHT: 65 IN

## 2019-03-13 DIAGNOSIS — M54.40 CHRONIC LOW BACK PAIN WITH SCIATICA, SCIATICA LATERALITY UNSPECIFIED, UNSPECIFIED BACK PAIN LATERALITY: ICD-10-CM

## 2019-03-13 DIAGNOSIS — J40 BRONCHITIS: Primary | ICD-10-CM

## 2019-03-13 DIAGNOSIS — G89.29 CHRONIC LOW BACK PAIN WITH SCIATICA, SCIATICA LATERALITY UNSPECIFIED, UNSPECIFIED BACK PAIN LATERALITY: ICD-10-CM

## 2019-03-13 DIAGNOSIS — J40 BRONCHITIS: ICD-10-CM

## 2019-03-13 DIAGNOSIS — M54.40 MIDLINE LOW BACK PAIN WITH SCIATICA, SCIATICA LATERALITY UNSPECIFIED, UNSPECIFIED CHRONICITY: ICD-10-CM

## 2019-03-13 PROCEDURE — G0463 HOSPITAL OUTPT CLINIC VISIT: HCPCS

## 2019-03-13 PROCEDURE — 99214 OFFICE O/P EST MOD 30 MIN: CPT | Performed by: INTERNAL MEDICINE

## 2019-03-13 RX ORDER — DIAZEPAM 10 MG
10 TABLET ORAL EVERY 6 HOURS PRN
Qty: 10 TABLET | Refills: 0 | Status: SHIPPED | OUTPATIENT
Start: 2019-03-13

## 2019-03-13 RX ORDER — HYDROCODONE BITARTRATE AND ACETAMINOPHEN 10; 325 MG/1; MG/1
TABLET ORAL
Qty: 140 TABLET | Refills: 0 | Status: SHIPPED | OUTPATIENT
Start: 2019-03-20

## 2019-03-13 RX ORDER — AZITHROMYCIN 250 MG/1
TABLET, FILM COATED ORAL
Qty: 11 TABLET | Refills: 0 | Status: SHIPPED | OUTPATIENT
Start: 2019-03-13 | End: 2019-03-13

## 2019-03-13 RX ORDER — AZITHROMYCIN 250 MG/1
TABLET, FILM COATED ORAL
Qty: 11 TABLET | Refills: 0 | Status: SHIPPED | OUTPATIENT
Start: 2019-03-13 | End: 2019-03-23

## 2019-03-13 ASSESSMENT — PAIN SCALES - GENERAL: PAINLEVEL: SEVERE PAIN (6)

## 2019-03-13 ASSESSMENT — MIFFLIN-ST. JEOR: SCORE: 1307.28

## 2019-03-13 NOTE — NURSING NOTE
"Chief Complaint   Patient presents with     Hypertension     Pain     Chronic        Initial /56 (BP Location: Right arm, Patient Position: Sitting, Cuff Size: Adult Regular)   Pulse 74   Temp 98.8  F (37.1  C) (Tympanic)   Resp 14   Ht 1.651 m (5' 5\")   Wt 68 kg (150 lb)   SpO2 95%   BMI 24.96 kg/m   Estimated body mass index is 24.96 kg/m  as calculated from the following:    Height as of this encounter: 1.651 m (5' 5\").    Weight as of this encounter: 68 kg (150 lb).  Medication Reconciliation: complete    Sally Lynch LPN    "

## 2019-04-24 DIAGNOSIS — I10 BENIGN ESSENTIAL HYPERTENSION: ICD-10-CM

## 2019-04-24 NOTE — TELEPHONE ENCOUNTER
Monopril       Last Written Prescription Date:  1/25/2019  Last Fill Quantity: 90,   # refills: 0  Last Office Visit: 3/13/2019  Future Office visit:

## 2019-04-25 RX ORDER — FOSINOPRIL SODIUM 20 MG/1
TABLET ORAL
Qty: 90 TABLET | Refills: 0 | Status: SHIPPED | OUTPATIENT
Start: 2019-04-25 | End: 2019-07-13

## 2020-01-30 ENCOUNTER — APPOINTMENT (RX ONLY)
Dept: URBAN - METROPOLITAN AREA CLINIC 147 | Facility: CLINIC | Age: 85
Setting detail: DERMATOLOGY
End: 2020-01-30

## 2020-01-30 DIAGNOSIS — L72.0 EPIDERMAL CYST: ICD-10-CM

## 2020-01-30 DIAGNOSIS — L82.1 OTHER SEBORRHEIC KERATOSIS: ICD-10-CM

## 2020-01-30 DIAGNOSIS — L21.8 OTHER SEBORRHEIC DERMATITIS: ICD-10-CM

## 2020-01-30 PROBLEM — Z85.79 PERSONAL HISTORY OF OTHER MALIGNANT NEOPLASMS OF LYMPHOID, HEMATOPOIETIC AND RELATED TISSUES: Status: ACTIVE | Noted: 2020-01-30

## 2020-01-30 PROBLEM — I10 ESSENTIAL (PRIMARY) HYPERTENSION: Status: ACTIVE | Noted: 2020-01-30

## 2020-01-30 PROBLEM — D48.5 NEOPLASM OF UNCERTAIN BEHAVIOR OF SKIN: Status: ACTIVE | Noted: 2020-01-30

## 2020-01-30 PROCEDURE — 69100 BIOPSY OF EXTERNAL EAR: CPT

## 2020-01-30 PROCEDURE — 99203 OFFICE O/P NEW LOW 30 MIN: CPT | Mod: 25

## 2020-01-30 PROCEDURE — ? BIOPSY BY SHAVE METHOD

## 2020-01-30 PROCEDURE — ? COUNSELING

## 2020-01-30 PROCEDURE — 11102 TANGNTL BX SKIN SINGLE LES: CPT | Mod: 59

## 2020-01-30 PROCEDURE — ? PRESCRIPTION

## 2020-01-30 RX ORDER — KETOCONAZOLE 20 MG/ML
SHAMPOO TOPICAL
Qty: 1 | Refills: 4 | Status: ERX | COMMUNITY
Start: 2020-01-30

## 2020-01-30 RX ORDER — KETOCONAZOLE 20 MG/G
CREAM TOPICAL
Qty: 1 | Refills: 3 | Status: ERX | COMMUNITY
Start: 2020-01-30

## 2020-01-30 RX ADMIN — KETOCONAZOLE: 20 SHAMPOO TOPICAL at 00:00

## 2020-01-30 RX ADMIN — KETOCONAZOLE: 20 CREAM TOPICAL at 00:00

## 2020-01-30 ASSESSMENT — LOCATION SIMPLE DESCRIPTION DERM
LOCATION SIMPLE: RIGHT FOREHEAD
LOCATION SIMPLE: CHEST
LOCATION SIMPLE: SCALP
LOCATION SIMPLE: POSTERIOR NECK
LOCATION SIMPLE: RIGHT ZYGOMA
LOCATION SIMPLE: ANTERIOR SCALP
LOCATION SIMPLE: LEFT UPPER BACK
LOCATION SIMPLE: ABDOMEN

## 2020-01-30 ASSESSMENT — LOCATION DETAILED DESCRIPTION DERM
LOCATION DETAILED: LEFT INFERIOR MEDIAL UPPER BACK
LOCATION DETAILED: LEFT CENTRAL POSTAURICULAR SKIN
LOCATION DETAILED: RIGHT LATERAL ZYGOMA
LOCATION DETAILED: LEFT LATERAL INFERIOR CHEST
LOCATION DETAILED: MID-FRONTAL SCALP
LOCATION DETAILED: RIGHT SUPERIOR FOREHEAD
LOCATION DETAILED: LEFT RIB CAGE
LOCATION DETAILED: LEFT MEDIAL TRAPEZIAL NECK
LOCATION DETAILED: RIGHT CENTRAL POSTAURICULAR SKIN

## 2020-01-30 ASSESSMENT — LOCATION ZONE DERM
LOCATION ZONE: SCALP
LOCATION ZONE: FACE
LOCATION ZONE: TRUNK
LOCATION ZONE: NECK

## 2020-02-26 ENCOUNTER — APPOINTMENT (RX ONLY)
Dept: URBAN - METROPOLITAN AREA CLINIC 147 | Facility: CLINIC | Age: 85
Setting detail: DERMATOLOGY
End: 2020-02-26

## 2020-02-26 DIAGNOSIS — L57.0 ACTINIC KERATOSIS: ICD-10-CM

## 2020-02-26 PROBLEM — D04.39 CARCINOMA IN SITU OF SKIN OF OTHER PARTS OF FACE: Status: ACTIVE | Noted: 2020-02-26

## 2020-02-26 PROCEDURE — 17282 DSTR MAL LS F/E/E/N/L/M1.1-2: CPT

## 2020-02-26 PROCEDURE — ? CURETTAGE AND DESTRUCTION

## 2020-02-26 PROCEDURE — ? LIQUID NITROGEN

## 2020-02-26 PROCEDURE — 17000 DESTRUCT PREMALG LESION: CPT | Mod: 59

## 2020-02-26 ASSESSMENT — LOCATION ZONE DERM: LOCATION ZONE: SCALP

## 2020-02-26 ASSESSMENT — LOCATION DETAILED DESCRIPTION DERM: LOCATION DETAILED: LEFT MEDIAL FRONTAL SCALP

## 2020-02-26 ASSESSMENT — LOCATION SIMPLE DESCRIPTION DERM: LOCATION SIMPLE: LEFT SCALP

## 2020-02-26 NOTE — PROCEDURE: CURETTAGE AND DESTRUCTION
Number Of Curettages: 3
Concentration (Mg/Ml Or Millions Of Plaque Forming Units/Cc): 0.01
Add Ability To Document Additional Intralesional Injection: No
Bill As A Line Item Or As Units: Line Item
Size Of Lesion After Curettage: 1.2
Anesthesia Type: 1% lidocaine with epinephrine
Biopsy Photograph Reviewed: Yes
Additional Information: (Optional): The wound was cleaned, and a pressure dressing was applied.  The patient received detailed post-op instructions.
Total Volume (Ccs): 1
Cautery Type: electrodesiccation
Post-Care Instructions: I reviewed with the patient in detail post-care instructions. Patient is to keep the area dry for 48 hours, and not to engage in any swimming until the area is healed. Should the patient develop any fevers, chills, bleeding, severe pain patient will contact the office immediately.
Detail Level: Detailed
What Was Performed First?: Curettage
Consent was obtained from the patient. The risks, benefits and alternatives to therapy were discussed in detail. Specifically, the risks of infection, scarring, bleeding, prolonged wound healing, nerve injury, incomplete removal, allergy to anesthesia and recurrence were addressed. Alternatives to ED&C, such as: surgical removal and XRT were also discussed.  Prior to the procedure, the treatment site was clearly identified and confirmed by the patient. All components of Universal Protocol/PAUSE Rule completed.

## 2020-02-26 NOTE — PROCEDURE: LIQUID NITROGEN
Consent: The patient's consent was obtained including but not limited to risks of crusting, scabbing, blistering, scarring, darker or lighter pigmentary change, recurrence, incomplete removal and infection.
Number Of Freeze-Thaw Cycles: 2 freeze-thaw cycles
Render Note In Bullet Format When Appropriate: No
Detail Level: Detailed
Duration Of Freeze Thaw-Cycle (Seconds): 4
Post-Care Instructions: I reviewed with the patient in detail post-care instructions. Patient is to wear sunprotection, and avoid picking at any of the treated lesions. Pt may apply Vaseline to crusted or scabbing areas.

## 2020-03-12 ENCOUNTER — APPOINTMENT (RX ONLY)
Dept: URBAN - METROPOLITAN AREA CLINIC 148 | Facility: CLINIC | Age: 85
Setting detail: DERMATOLOGY
End: 2020-03-12

## 2020-03-12 PROBLEM — D04.22 CARCINOMA IN SITU OF SKIN OF LEFT EAR AND EXTERNAL AURICULAR CANAL: Status: ACTIVE | Noted: 2020-03-12

## 2020-03-12 PROBLEM — Z85.46 PERSONAL HISTORY OF MALIGNANT NEOPLASM OF PROSTATE: Status: ACTIVE | Noted: 2020-03-12

## 2020-03-12 PROCEDURE — 17312 MOHS ADDL STAGE: CPT

## 2020-03-12 PROCEDURE — 17311 MOHS 1 STAGE H/N/HF/G: CPT

## 2020-03-12 PROCEDURE — ? MOHS SURGERY

## 2025-07-14 NOTE — TELEPHONE ENCOUNTER
CLINICAL PHARMACY NOTE: MEDS TO BEDS    Total # of Prescriptions Filled: 5   The following medications were delivered to the patient:  Scopalamine 1mg patches  Nitroglycerin 0.4mg subl  Amlodipine 2.5mg tabs  Isosorbide mono e 30mg tabs  Hydrocodone-acetaminophen 5-325mg tabs    Additional Documentation:  Delivered to pt in room 402 on 7/14 by HYACIN[facility]. Copay was $29.80 paid with [employer]   Prescription picked up byJavier Amador  ID Verified yes